# Patient Record
Sex: MALE | Race: WHITE | URBAN - METROPOLITAN AREA
[De-identification: names, ages, dates, MRNs, and addresses within clinical notes are randomized per-mention and may not be internally consistent; named-entity substitution may affect disease eponyms.]

---

## 2018-05-29 ENCOUNTER — OFFICE VISIT (OUTPATIENT)
Dept: FAMILY MEDICINE | Facility: CLINIC | Age: 58
End: 2018-05-29
Payer: COMMERCIAL

## 2018-05-29 VITALS
OXYGEN SATURATION: 99 % | BODY MASS INDEX: 21.52 KG/M2 | DIASTOLIC BLOOD PRESSURE: 64 MMHG | HEART RATE: 61 BPM | TEMPERATURE: 98.1 F | RESPIRATION RATE: 14 BRPM | HEIGHT: 74 IN | WEIGHT: 167.7 LBS | SYSTOLIC BLOOD PRESSURE: 102 MMHG

## 2018-05-29 DIAGNOSIS — G47.00 INSOMNIA, UNSPECIFIED TYPE: ICD-10-CM

## 2018-05-29 DIAGNOSIS — F33.2 SEVERE EPISODE OF RECURRENT MAJOR DEPRESSIVE DISORDER, WITHOUT PSYCHOTIC FEATURES (H): Primary | ICD-10-CM

## 2018-05-29 DIAGNOSIS — F41.9 ANXIETY: ICD-10-CM

## 2018-05-29 PROCEDURE — 99204 OFFICE O/P NEW MOD 45 MIN: CPT | Performed by: FAMILY MEDICINE

## 2018-05-29 RX ORDER — BUPROPION HYDROCHLORIDE 300 MG/1
300 TABLET ORAL EVERY MORNING
COMMUNITY

## 2018-05-29 RX ORDER — DIAZEPAM 5 MG
5 TABLET ORAL EVERY 12 HOURS PRN
Qty: 10 TABLET | Refills: 0 | Status: SHIPPED | OUTPATIENT
Start: 2018-05-29

## 2018-05-29 RX ORDER — AMITRIPTYLINE HYDROCHLORIDE 10 MG/1
20 TABLET ORAL AT BEDTIME
COMMUNITY
End: 2020-08-27

## 2018-05-29 RX ORDER — VIT C/B6/B5/MAGNESIUM/HERB 173 50-5-6-5MG
1 CAPSULE ORAL DAILY
COMMUNITY

## 2018-05-29 RX ORDER — DIAZEPAM 10 MG
10 TABLET ORAL EVERY 6 HOURS PRN
COMMUNITY

## 2018-05-29 RX ORDER — MAG HYDROX/ALUMINUM HYD/SIMETH 400-400-40
1 SUSPENSION, ORAL (FINAL DOSE FORM) ORAL DAILY
COMMUNITY

## 2018-05-29 RX ORDER — TRAZODONE HYDROCHLORIDE 50 MG/1
50 TABLET, FILM COATED ORAL AT BEDTIME
COMMUNITY

## 2018-05-29 RX ORDER — CITALOPRAM HYDROBROMIDE 20 MG/1
20 TABLET ORAL DAILY
COMMUNITY
End: 2020-08-27

## 2018-05-29 RX ORDER — IBUPROFEN 200 MG
200 TABLET ORAL EVERY 4 HOURS PRN
COMMUNITY

## 2018-05-29 ASSESSMENT — PATIENT HEALTH QUESTIONNAIRE - PHQ9: 5. POOR APPETITE OR OVEREATING: NEARLY EVERY DAY

## 2018-05-29 ASSESSMENT — ANXIETY QUESTIONNAIRES
2. NOT BEING ABLE TO STOP OR CONTROL WORRYING: NEARLY EVERY DAY
7. FEELING AFRAID AS IF SOMETHING AWFUL MIGHT HAPPEN: NEARLY EVERY DAY
3. WORRYING TOO MUCH ABOUT DIFFERENT THINGS: NEARLY EVERY DAY
GAD7 TOTAL SCORE: 19
5. BEING SO RESTLESS THAT IT IS HARD TO SIT STILL: MORE THAN HALF THE DAYS
IF YOU CHECKED OFF ANY PROBLEMS ON THIS QUESTIONNAIRE, HOW DIFFICULT HAVE THESE PROBLEMS MADE IT FOR YOU TO DO YOUR WORK, TAKE CARE OF THINGS AT HOME, OR GET ALONG WITH OTHER PEOPLE: EXTREMELY DIFFICULT
6. BECOMING EASILY ANNOYED OR IRRITABLE: MORE THAN HALF THE DAYS
1. FEELING NERVOUS, ANXIOUS, OR ON EDGE: NEARLY EVERY DAY

## 2018-05-29 NOTE — PROGRESS NOTES
SUBJECTIVE:   Arjun Muro is a 57 year old male who presents to clinic today for the following health issues:    History of depression-   Severe MDD- bad 22 yrs ago, likely melancholia for most of his life, but had first severe bout of sx's ~22 yrs ago when he was in a graduate program in [place].  Horrible episode.  Had insomnia for months during grad school in [place] (also triggered by untreated pneumonia that festered and worsened for months due to no health insurance).  Seeing therapist, finally recommended he see a psychiatrist.  It was a very difficult time, he ended up taking a year off of grad school, then went back and finished.    He has been mostly on psychiatric meds since then.    Has tried going off meds a couple times, but after second time, was told to stay on them for life.    Initially was put on paxil- stayed on that for many yrs, worked well.    ~15 yrs ago, some of the sx's returned, while on the paxil.  Went back into insomnia sx's, and a psychiatrist put him on clonazepam in addition to the paxil.  Was on that combination for yrs, then wellbutrin was added ~7 yrs ago.    ~4-5 yrs ago, was reading about clonazepam, long-term cognitive impairment issues.  Went to a NP primary care provider urgently, who weaned him off, and started a new regimen.  Kept him on the wellbutrin, but switched the paxil to citalopram.  For insomnia, tried amitriptyline for sleep first (but gave him grogginess se's), so switched that to trazodone 50-100mg/day.    Regimen for the past 4-5 yrs-   Citalopram, trazodone and wellbutrin.    Did well on those for awhile.    Sx's started worsening in the past year, worse in last couple months, and then horrible for past week. Insomnia returning, which makes him very anxious due to the horrible sx's he had in grad school.  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    Sx's in the past few months/year---  Depression like he's in a black hole.  Anxiety that is wrapped on top of that.  Insomnia started worsening a couple months ago- unable to get or stay asleep.  Very minimal sleep in last couple week.  Anxiety built- insomnia gives him almost PTSD thoughts of the first episode.    Tried increasing trazodone to 100mg at night, then found his old amitriptyline and tried that, but it still makes him really groggy.  Started having more SI thoughts, so went to ER 5 days ago.     At [facility] last week.  Rec stopping amitriptyline.  Rec psychiatrist, therapist.  Rx'd diazepam for SI.  Diazepam- said he could do up to 4x/day, but only doing 2x/day.  Helps a lot- was able to sleep.  12 hrs/night.  #15 rx'd, he has ~#10 left.      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  Administration doesn't realize he is close to breaking.  Needs the summer off.  Would like 12 weeks to reset and recharge and get his mood, anxiety and sleep under control.  9/10/18- first day of classes to return to work (though that is more than 12 weeks).      Fam h/o-   Sister - likely some depression  Father- etoh, rages  Mother- mdd  No known bipolar in family.        Problem list and histories reviewed & adjusted, as indicated.  Additional history: as documented    Patient Active Problem List   Diagnosis     Severe episode of recurrent major depressive disorder, without psychotic features (H)     Anxiety     Insomnia, unspecified type      Current Outpatient Prescriptions   Medication Sig Dispense Refill     ALLERGY INJECTIONS every 28 days       amitriptyline (ELAVIL) 10 MG tablet Take 20 mg by mouth At Bedtime       buPROPion (WELLBUTRIN XL) 300 MG 24 hr tablet Take 300 mg by mouth every morning       citalopram (CELEXA) 20 MG tablet Take 20 mg by mouth daily       diazepam (VALIUM) 10 MG tablet Take 10  "mg by mouth every 6 hours as needed for anxiety or sleep       diazepam (VALIUM) 5 MG tablet Take 1 tablet (5 mg) by mouth every 12 hours as needed for anxiety or sleep 10 tablet 0     ibuprofen (ADVIL/MOTRIN) 200 MG tablet Take 200 mg by mouth every 4 hours as needed for mild pain (take 2 tabs as needed)       Multiple Vitamin (MULTI VITAMIN  MENS) TABS Take 1 tablet by mouth daily       Omega-3 Fatty Acids (FISH OIL TRIPLE STRENGTH) 1400 MG CAPS Take 1 capsule by mouth daily       traZODone (DESYREL) 50 MG tablet Take 50 mg by mouth At Bedtime       Turmeric 500 MG CAPS Take 1 capsule by mouth daily       No Known Allergies  BP Readings from Last 3 Encounters:   05/29/18 102/64    Wt Readings from Last 3 Encounters:   05/29/18 167 lb 11.2 oz (76.1 kg)            Labs reviewed in EPIC    Reviewed and updated as needed this visit by clinical staff  Tobacco  Allergies  Meds  Problems  Med Hx  Surg Hx  Fam Hx  Soc Hx        Reviewed and updated as needed this visit by Provider  Allergies  Meds  Problems         ROS:  Constitutional, HEENT, cardiovascular, pulmonary, GI, , musculoskeletal, neuro, skin, endocrine and psych systems are negative, except as otherwise noted.    OBJECTIVE:     /64  Pulse 61  Temp 98.1  F (36.7  C) (Oral)  Resp 14  Ht 6' 1.5\" (1.867 m)  Wt 167 lb 11.2 oz (76.1 kg)  SpO2 99%  BMI 21.83 kg/m2  Body mass index is 21.83 kg/(m^2).  GENERAL: healthy, alert and no distress  EYES: Eyes grossly normal to inspection, PERRL and conjunctivae and sclerae normal  HENT: ear canals and TM's normal, nose and mouth without ulcers or lesions  NECK: no adenopathy, no asymmetry, masses, or scars and thyroid normal to palpation  RESP: lungs clear to auscultation - no rales, rhonchi or wheezes  CV: regular rate and rhythm, normal S1 S2, no S3 or S4, no murmur, click or rub, no peripheral edema and peripheral pulses strong  ABDOMEN: soft, nontender, no hepatosplenomegaly, no masses and bowel " sounds normal  MS: no gross musculoskeletal defects noted, no edema  SKIN: no suspicious lesions or rashes  NEURO: Normal strength and tone, mentation intact and speech normal  PSYCH: mentation appears normal, appears anxious and judgement and insight intact    Diagnostic Test Results:  none     ASSESSMENT/PLAN:       ICD-10-CM    1. Severe episode of recurrent major depressive disorder, without psychotic features (H) F33.2 MENTAL HEALTH REFERRAL  - Adult; Psychiatry and Medication Management; Psychiatry; Other: [facility] [phone]; We will contact you to schedule the appointment or please call with any questions     MENTAL HEALTH REFERRAL  - Adult; Outpatient Treatment; Individual/Couples/Family/Group Therapy/Health Psychology; Other: [facility] [phone]; We will contact you to schedule the appointment or please call with any questi...     diazepam (VALIUM) 5 MG tablet   2. Anxiety F41.9 MENTAL HEALTH REFERRAL  - Adult; Psychiatry and Medication Management; Psychiatry; Other: [facility] [phone]; We will contact you to schedule the appointment or please call with any questions     MENTAL HEALTH REFERRAL  - Adult; Outpatient Treatment; Individual/Couples/Family/Group Therapy/Health Psychology; Other: [facility] [phone]; We will contact you to schedule the appointment or please call with any questi...     diazepam (VALIUM) 5 MG tablet   3. Insomnia, unspecified type G47.00 MENTAL HEALTH REFERRAL  - Adult; Psychiatry and Medication Management; Psychiatry; Other: [facility] [phone]; We will contact you to schedule the appointment or please call with any questions     MENTAL HEALTH REFERRAL  - Adult; Outpatient Treatment; Individual/Couples/Family/Group Therapy/Health Psychology; Other: [facility] [phone]; We will contact you to schedule the appointment or  please call with any questi...     diazepam (VALIUM) 5 MG tablet     MDD/Anxiety/Insomnia-  Long h/o dysthymia, with severe bout of MDD/Anxiety in graduate school, and has been on medications since that time, with some adjustments when sx's have returned.  Insomnia does not sound c/w harrison, no other manic sx's.   Insomnia really worsens his other sx's, however.    He is feeling better with the diazepam (given to him from ER a few days ago), which allowed him to get good sleep the last few nights.  Agree with recs to get to psychiatry and start therapy- referrals placed.  He is also in agreement that the diazepam should be a short-term option and that he should use it only for a short time.  Had been on clonazepam for yrs in the past and requested going off himself due to reading about long-term cognitive se's.    Recommendations...  --Rec lower diazepam dose, and slowly weaning down and off.  --Rec using higher dose of trazodone - 150mg at night, and using 2.5mg (1/2 tab) if unable to get to sleep, and another 1/2 tab if still unable to sleep.    --Would stop the amitriptyline completely.  --For now, will have him continue on the citalopram 20mg/d and wellbutrin XL 300mg/d and focus on the sleep and less work.    --Work- okay with approving 12 weeks off work, though going until first day of school from now will be longer than 12 weeks, and goes into long-term disability, which is likely not needed based on today's assessment.  Will discuss this with him when forms come in.      Sivan Mullins MD  [facility]     Spent greater than 50% of 45 minutes in face to face time counseling patient regarding severe depression, anxiety and insomnia sx's and treatment plan, work stressors and plan.

## 2018-05-29 NOTE — MR AVS SNAPSHOT
After Visit Summary   5/29/2018    Arjun Muro    MRN: [id number]           Patient Information     Date Of Birth          1960        Visit Information        Provider Department      5/29/2018 11:30 AM Sivan Mullins MD [facility]        Today's Diagnoses     Severe episode of recurrent major depressive disorder, without psychotic features (H)    -  1    Anxiety        Insomnia, unspecified type           Follow-ups after your visit        Additional Services     MENTAL HEALTH REFERRAL  - Adult; Outpatient Treatment; Individual/Couples/Family/Group Therapy/Health Psychology; Other: [facility] [phone]; We will contact you to schedule the appointment or please call with any questi...       All scheduling is subject to the client's specific insurance plan & benefits, provider/location availability, and provider clinical specialities.  Please arrive 15 minutes early for your first appointment and bring your completed paperwork.    Please be aware that coverage of these services is subject to the terms and limitations of your health insurance plan.  Call member services at your health plan with any benefit or coverage questions.                      MENTAL HEALTH REFERRAL  - Adult; Psychiatry and Medication Management; Psychiatry; Other: [facility] [phone]; We will contact you to schedule the appointment or please call with any questions       All scheduling is subject to the client's specific insurance plan & benefits, provider/location availability, and provider clinical specialities.  Please arrive 15 minutes early for your first appointment and bring your completed paperwork.    Please be aware that coverage of these services is subject to the terms and limitations of your health insurance plan.  Call member services at your health plan with any benefit or coverage questions.                            Who to  "contact     If you have questions or need follow up information about today's clinic visit or your schedule please contact [facility] directly at [phone].  Normal or non-critical lab and imaging results will be communicated to you by MyChart, letter or phone within 4 business days after the clinic has received the results. If you do not hear from us within 7 days, please contact the clinic through MyChart or phone. If you have a critical or abnormal lab result, we will notify you by phone as soon as possible.  Submit refill requests through [employer] or call your pharmacy and they will forward the refill request to us. Please allow 3 business days for your refill to be completed.          Additional Information About Your Visit        [facility] Information     [employer] lets you send messages to your doctor, view your test results, renew your prescriptions, schedule appointments and more. To sign up, go to [email or web address] . Click on \"Log in\" on the left side of the screen, which will take you to the Welcome page. Then click on \"Sign up Now\" on the right side of the page.     You will be asked to enter the access code listed below, as well as some personal information. Please follow the directions to create your username and password.     Your access code is: [email or web address]  Expires: 2018 12:37 PM     Your access code will  in 90 days. If you need help or a new code, please call your [facility] clinic or [phone].        Care EveryWhere ID     This is your Care EveryWhere ID. This could be used by other organizations to access your [facility] medical records  [id number]        Your Vitals Were     Pulse Temperature Respirations Height Pulse Oximetry BMI (Body Mass Index)    61 98.1  F (36.7  C) (Oral) 14 6' 1.5\" (1.867 m) 99% 21.83 kg/m2       Blood Pressure from Last 3 Encounters:   18 102/64    Weight from Last 3 Encounters:   18 167 lb 11.2 oz (76.1 kg)              We " Performed the Following     MENTAL HEALTH REFERRAL  - Adult; Outpatient Treatment; Individual/Couples/Family/Group Therapy/Health Psychology; Other: [facility] [phone]; We will contact you to schedule the appointment or please call with any questi...     MENTAL HEALTH REFERRAL  - Adult; Psychiatry and Medication Management; Psychiatry; Other: [facility] [phone]; We will contact you to schedule the appointment or please call with any questions          Today's Medication Changes          These changes are accurate as of 5/29/18 12:37 PM.  If you have any questions, ask your nurse or doctor.               These medicines have changed or have updated prescriptions.        Dose/Directions    * diazepam 10 MG tablet   Commonly known as:  VALIUM   This may have changed:  Another medication with the same name was added. Make sure you understand how and when to take each.   Changed by:  Sivan Mullins MD        Dose:  10 mg   Take 10 mg by mouth every 6 hours as needed for anxiety or sleep   Refills:  0       * diazepam 5 MG tablet   Commonly known as:  VALIUM   This may have changed:  You were already taking a medication with the same name, and this prescription was added. Make sure you understand how and when to take each.   Used for:  Insomnia, unspecified type, Anxiety, Severe episode of recurrent major depressive disorder, without psychotic features (H)   Changed by:  Sivan Mullins MD        Dose:  5 mg   Take 1 tablet (5 mg) by mouth every 12 hours as needed for anxiety or sleep   Quantity:  10 tablet   Refills:  0       * Notice:  This list has 2 medication(s) that are the same as other medications prescribed for you. Read the directions carefully, and ask your doctor or other care provider to review them with you.         Where to get your medicines      Some of these will need a paper prescription and others can be bought over the counter.   Ask your nurse if you have questions.     Bring a paper prescription for each of these medications     diazepam 5 MG tablet                Primary Care Provider Office Phone # Fax #    Sivan Mullins [phone] [address] [address]        Equal Access to Services     AJ RODRIGUEZ : Hadii aad ku hadasho Soomaali, waaxda luqadaha, qaybta kaalmada adeegyada, waxay danielin hayjolenen adeantione elliottbrissa darby. So [facility] [phone].    ATENCIÓN: Si habla español, tiene a castro disposición servicios gratuitos de asistencia lingüística. [facility] [phone].    We comply with applicable federal civil rights laws and [place] laws. We do not discriminate on the basis of race, color, national origin, age, disability, sex, sexual orientation, or gender identity.            Thank you!     Thank you for choosing [facility]  for your care. Our goal is always to provide you with excellent care. Hearing back from our patients is one way we can continue to improve our services. Please take a few minutes to complete the written survey that you may receive in the mail after your visit with us. Thank you!             Your Updated Medication List - Protect others around you: Learn how to safely use, store and throw away your medicines at www.disposemymeds.org.          This list is accurate as of 5/29/18 12:37 PM.  Always use your most recent med list.                   Brand Name Dispense Instructions for use Diagnosis    ALLERGY INJECTIONS      every 28 days        amitriptyline 10 MG tablet    ELAVIL     Take 20 mg by mouth At Bedtime        citalopram 20 MG tablet    celeXA     Take 20 mg by mouth daily        * diazepam 10 MG tablet    VALIUM     Take 10 mg by mouth every 6 hours as needed for anxiety or sleep        * diazepam 5 MG tablet    VALIUM    10 tablet    Take 1 tablet (5 mg) by mouth every 12 hours as needed for anxiety or sleep    Insomnia, unspecified type,  Anxiety, Severe episode of recurrent major depressive disorder, without psychotic features (H)       FISH OIL TRIPLE STRENGTH 1400 MG Caps      Take 1 capsule by mouth daily        ibuprofen 200 MG tablet    ADVIL/MOTRIN     Take 200 mg by mouth every 4 hours as needed for mild pain (take 2 tabs as needed)        MULTI vitamin  MENS Tabs      Take 1 tablet by mouth daily        traZODone 50 MG tablet    DESYREL     Take 50 mg by mouth At Bedtime        Turmeric 500 MG Caps      Take 1 capsule by mouth daily        WELLBUTRIN  MG 24 hr tablet   Generic drug:  buPROPion      Take 300 mg by mouth every morning        * Notice:  This list has 2 medication(s) that are the same as other medications prescribed for you. Read the directions carefully, and ask your doctor or other care provider to review them with you.

## 2018-05-29 NOTE — NURSING NOTE
"Chief Complaint   Patient presents with     Stress     from work-New Patient     Personal     discuss personal issues       Initial /64  Pulse 61  Temp 98.1  F (36.7  C) (Oral)  Resp 14  Ht 6' 1.5\" (1.867 m)  Wt 167 lb 11.2 oz (76.1 kg)  SpO2 99%  BMI 21.83 kg/m2 Estimated body mass index is 21.83 kg/(m^2) as calculated from the following:    Height as of this encounter: 6' 1.5\" (1.867 m).    Weight as of this encounter: 167 lb 11.2 oz (76.1 kg).  BP completed using cuff size: regular    Health Maintenance that is potentially due pending provider review:  Health Maintenance Due   Topic Date Due     TETANUS IMMUNIZATION (SYSTEM ASSIGNED)  11/19/1976     HIV SCREEN (SYSTEM ASSIGNED)  11/19/1976     HEPATITIS C SCREENING  11/19/1976     LIPID SCREEN Q5 YR MALE (SYSTEM ASSIGNED)  11/19/1993     COLON CANCER SCREEN (SYSTEM ASSIGNED)  11/19/2008     ADVANCE DIRECTIVE PLANNING Q5 YRS  11/19/2013     colonoscopy done- [facility]-2017-WNL      FLORES--Sent FLORES for records and colonoscopy report  "

## 2018-05-30 ASSESSMENT — ANXIETY QUESTIONNAIRES: GAD7 TOTAL SCORE: 19

## 2018-05-30 ASSESSMENT — PATIENT HEALTH QUESTIONNAIRE - PHQ9: SUM OF ALL RESPONSES TO PHQ QUESTIONS 1-9: 25

## 2018-06-06 ENCOUNTER — TELEPHONE (OUTPATIENT)
Dept: FAMILY MEDICINE | Facility: CLINIC | Age: 58
End: 2018-06-06

## 2018-06-06 NOTE — LETTER
[facility]  [address]  [address]  [place], [place] [address]  [phone]    June 8, 2018    RE:  Arjun Muro                                                                                                                              [address]  [facility] [address]            To whom it may concern:    Please note that we will see Arjun Muro back in the office to follow-up on his condition and fully fill out FMLA forms on Tuesday, 6/12/18.  We anticipate his time off work to be roughly needed from 6/6/18 to 8/15/18.      Sincerely,        Sivan Mullins MD      Clinic hours:  Monday 7:30 AM - 5:00 PM    Tuesday  7:00 AM - 7:00 PM    Wednesday  7:00 AM - 5:00 PM    Thursday  7:30 AM -  7:00 PM    Friday   7:30 AM -  5:00 PM

## 2018-06-06 NOTE — TELEPHONE ENCOUNTER
Received form(s) from pt for fmla.  Placed form(s) in/on CW's box.  Forms need to be filled out and signed and faxed to number listed on form..    Call pt to verify form was sent: No  Copy needs to be sent for scanning after completion: Yes    Eileen Ivey CMA

## 2018-06-08 NOTE — TELEPHONE ENCOUNTER
Called and left msg to have him call back with good times to reach him today, or to talk to triage and give end/start dates for work (that is within 12 weeks for the [facility] [employer]) - I will likely be able to finish forms with that information.  Triage- you could try and reach him as well if able.  Thanks- CW

## 2018-06-08 NOTE — TELEPHONE ENCOUNTER
Called and spoke w/ pt-  Dates off work- 6/6/18- 8/15/18.  Unable to fill out FMLA forms, though because they wrote in incorrect information on the forms.  He will f/u next week on Tues (12:45pm appt- for f/u MDD and FMLA forms), and he'll bring in fresh form and we'll complete them at this point.    He requested we send a letter to his work stating that we'll compete FMLA forms on 6/12/18.  Letter completed- please fax Attn Gin Nielsen at [phone].    Please also do appt for him - 5/12/18, 12:45pm, 30 min.  F/u MDD and FMLA forms.  Thanks!  CW

## 2018-06-12 ENCOUNTER — OFFICE VISIT (OUTPATIENT)
Dept: FAMILY MEDICINE | Facility: CLINIC | Age: 58
End: 2018-06-12
Payer: COMMERCIAL

## 2018-06-12 VITALS
WEIGHT: 168.8 LBS | OXYGEN SATURATION: 99 % | BODY MASS INDEX: 21.66 KG/M2 | TEMPERATURE: 98.4 F | DIASTOLIC BLOOD PRESSURE: 66 MMHG | SYSTOLIC BLOOD PRESSURE: 122 MMHG | HEART RATE: 60 BPM | HEIGHT: 74 IN

## 2018-06-12 DIAGNOSIS — Z79.899 HIGH RISK MEDICATION USE: ICD-10-CM

## 2018-06-12 DIAGNOSIS — G47.00 INSOMNIA, UNSPECIFIED TYPE: ICD-10-CM

## 2018-06-12 DIAGNOSIS — F33.2 SEVERE EPISODE OF RECURRENT MAJOR DEPRESSIVE DISORDER, WITHOUT PSYCHOTIC FEATURES (H): Primary | ICD-10-CM

## 2018-06-12 DIAGNOSIS — Z13.21 ENCOUNTER FOR VITAMIN DEFICIENCY SCREENING: ICD-10-CM

## 2018-06-12 DIAGNOSIS — Z36.3 ENCOUNTER FOR ROUTINE SCREENING FOR MALFORMATION USING ULTRASONICS: ICD-10-CM

## 2018-06-12 DIAGNOSIS — F41.9 ANXIETY: ICD-10-CM

## 2018-06-12 LAB
BASOPHILS # BLD AUTO: 0 10E9/L (ref 0–0.2)
BASOPHILS NFR BLD AUTO: 0.4 %
DIFFERENTIAL METHOD BLD: ABNORMAL
EOSINOPHIL # BLD AUTO: 0.1 10E9/L (ref 0–0.7)
EOSINOPHIL NFR BLD AUTO: 1.4 %
ERYTHROCYTE [DISTWIDTH] IN BLOOD BY AUTOMATED COUNT: 12.4 % (ref 10–15)
HCT VFR BLD AUTO: 44.9 % (ref 40–53)
HGB BLD-MCNC: 15 G/DL (ref 13.3–17.7)
LYMPHOCYTES # BLD AUTO: 1.5 10E9/L (ref 0.8–5.3)
LYMPHOCYTES NFR BLD AUTO: 26.7 %
MCH RBC QN AUTO: 33.6 PG (ref 26.5–33)
MCHC RBC AUTO-ENTMCNC: 33.4 G/DL (ref 31.5–36.5)
MCV RBC AUTO: 100 FL (ref 78–100)
MONOCYTES # BLD AUTO: 0.5 10E9/L (ref 0–1.3)
MONOCYTES NFR BLD AUTO: 8.4 %
NEUTROPHILS # BLD AUTO: 3.6 10E9/L (ref 1.6–8.3)
NEUTROPHILS NFR BLD AUTO: 63.1 %
PLATELET # BLD AUTO: 125 10E9/L (ref 150–450)
RBC # BLD AUTO: 4.47 10E12/L (ref 4.4–5.9)
WBC # BLD AUTO: 5.7 10E9/L (ref 4–11)

## 2018-06-12 PROCEDURE — 80053 COMPREHEN METABOLIC PANEL: CPT

## 2018-06-12 PROCEDURE — 84403 ASSAY OF TOTAL TESTOSTERONE: CPT

## 2018-06-12 PROCEDURE — 36415 COLL VENOUS BLD VENIPUNCTURE: CPT

## 2018-06-12 PROCEDURE — 99214 OFFICE O/P EST MOD 30 MIN: CPT | Performed by: FAMILY MEDICINE

## 2018-06-12 PROCEDURE — 85025 COMPLETE CBC W/AUTO DIFF WBC: CPT

## 2018-06-12 PROCEDURE — 84443 ASSAY THYROID STIM HORMONE: CPT

## 2018-06-12 PROCEDURE — 82306 VITAMIN D 25 HYDROXY: CPT

## 2018-06-12 PROCEDURE — 84439 ASSAY OF FREE THYROXINE: CPT

## 2018-06-12 RX ORDER — MIRTAZAPINE 15 MG/1
15 TABLET, FILM COATED ORAL AT BEDTIME
COMMUNITY

## 2018-06-12 ASSESSMENT — ANXIETY QUESTIONNAIRES
GAD7 TOTAL SCORE: 19
2. NOT BEING ABLE TO STOP OR CONTROL WORRYING: NEARLY EVERY DAY
3. WORRYING TOO MUCH ABOUT DIFFERENT THINGS: NEARLY EVERY DAY
5. BEING SO RESTLESS THAT IT IS HARD TO SIT STILL: NEARLY EVERY DAY
7. FEELING AFRAID AS IF SOMETHING AWFUL MIGHT HAPPEN: NEARLY EVERY DAY
IF YOU CHECKED OFF ANY PROBLEMS ON THIS QUESTIONNAIRE, HOW DIFFICULT HAVE THESE PROBLEMS MADE IT FOR YOU TO DO YOUR WORK, TAKE CARE OF THINGS AT HOME, OR GET ALONG WITH OTHER PEOPLE: EXTREMELY DIFFICULT
1. FEELING NERVOUS, ANXIOUS, OR ON EDGE: NEARLY EVERY DAY
6. BECOMING EASILY ANNOYED OR IRRITABLE: SEVERAL DAYS

## 2018-06-12 ASSESSMENT — PATIENT HEALTH QUESTIONNAIRE - PHQ9: 5. POOR APPETITE OR OVEREATING: NEARLY EVERY DAY

## 2018-06-12 NOTE — PROGRESS NOTES
SUBJECTIVE:   Arjun Muro is a 57 year old male who presents to clinic today for the following health issues:    Depression and Anxiety Follow-Up    Status since last visit: about the same     Other associated symptoms:ratic sleep,headaches     Complicating factors:     Significant life event: Nothing since last visit      Current substance abuse: None    PHQ-9 5/29/2018 6/12/2018   Total Score 25 26   Q9: Suicide Ideation Several days Nearly every day     RYAN-7 SCORE 5/29/2018 6/12/2018   Total Score 19 19     In the past two weeks have you had thoughts of suicide or self-harm?  Yes  In the past two weeks have you thought of a plan or intent to harm yourself? No.  Do you have concerns about your personal safety or the safety of others?   No  PHQ-9  English  PHQ-9   Any Language  RYAN-7  Suicide Assessment Five-step Evaluation and Treatment (SAFE-T)    Amount of exercise or physical activity: every other day run or elliptical machine    Problems taking medications regularly: No    Medication side effects: hard to tell    Diet: regular (no restrictions)    Since last visit, updates.    Went to see psychiatry last Thur- telemedicine visit on screen.    Dr. Nelly Mitchell- based in [place], through [facility].  Pt felt good about the visit and care.  Feels like it is a workable consult despite her location.  Was on trazodone- 100mg, and really drowsy during the day, but slept.  He was taken off trazodone, put him on mirtazapine.  Mirtazapine 15mg/d. Slept for a couple nights, but then woke up at 4am.  More anxiety during day on it.  Has a call out to her to see if he should make some adjustments.  Next appt is next week.  Plans to make more med changes at that time.    He does still have valium to use as a rescue- used it Sun francisca, and when he woke up at 4am.  He's used a valium ~3 times since last visit.    Yesterday- second meeting with therapist.    Work-   Inquired about FMLA on 5/29/18, just had one  class left, and missed that (5/30/18 Tues).  Reported ill, after ER visit.  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  Missed meeting with edwina.  Didn't grade from last Tues to this Tues- finished all day yesterday.      Partner- Looking to get [facility] time off work to help care for pt, and get him to his appointments.  [email or web address]- work has approved it.  She brings in forms, but should be able to use his [facility] forms to justify her time.    She is helping with...  [employer]- keeping track of things- appts, medications, making sure that things are happening.  Driving- one time, didn't realize where he was.  13 and 15yo daughters.  Helps a lot when she is there.  Helps reduce his anxiety.  Making things easier for him.  Doing intermittent leave, working from home.  Taking time off to do appts.      Problem list and histories reviewed & adjusted, as indicated.  Additional history: as documented    Patient Active Problem List   Diagnosis     Severe episode of recurrent major depressive disorder, without psychotic features (H)     Anxiety     Insomnia, unspecified type     No past surgical history on file.    Social History   Substance Use Topics     Smoking status: Never Smoker     Smokeless tobacco: Never Used     Alcohol use Yes      Comment: 2x month 1 beer or 1 glass of wine     Family History   Problem Relation Age of Onset     Alzheimer Disease Mother      Substance Abuse Father      Substance Abuse Maternal Grandfather      Arthritis Paternal Grandfather      Depression Sister          Current Outpatient Prescriptions   Medication Sig Dispense Refill     buPROPion (WELLBUTRIN XL) 300 MG 24 hr tablet Take 300 mg by mouth every morning       citalopram (CELEXA) 20 MG tablet Take 20 mg by mouth daily       diazepam (VALIUM) 5 MG tablet Take 1 tablet (5 mg) by mouth every 12 hours as needed for anxiety or sleep 10 tablet 0     ibuprofen (ADVIL/MOTRIN) 200 MG tablet Take 200 mg by mouth every 4 hours  "as needed for mild pain (take 2 tabs as needed)       mirtazapine (REMERON) 15 MG tablet Take 15 mg by mouth At Bedtime       Multiple Vitamin (MULTI VITAMIN  MENS) TABS Take 1 tablet by mouth daily       Omega-3 Fatty Acids (FISH OIL TRIPLE STRENGTH) 1400 MG CAPS Take 1 capsule by mouth daily       Turmeric 500 MG CAPS Take 1 capsule by mouth daily       ALLERGY INJECTIONS every 28 days       amitriptyline (ELAVIL) 10 MG tablet Take 20 mg by mouth At Bedtime       diazepam (VALIUM) 10 MG tablet Take 10 mg by mouth every 6 hours as needed for anxiety or sleep       traZODone (DESYREL) 50 MG tablet Take 50 mg by mouth At Bedtime       No Known Allergies  Recent Labs   Lab Test  06/12/18   1337   ALT  31   CR  1.12   GFRESTIMATED  67   GFRESTBLACK  81   POTASSIUM  4.7   TSH  4.30*      BP Readings from Last 3 Encounters:   06/12/18 122/66   05/29/18 102/64    Wt Readings from Last 3 Encounters:   06/12/18 168 lb 12.8 oz (76.6 kg)   05/29/18 167 lb 11.2 oz (76.1 kg)            Labs reviewed in EPIC    Reviewed and updated as needed this visit by clinical staff  Tobacco  Allergies  Meds  Problems       Reviewed and updated as needed this visit by Provider  Allergies  Meds  Problems         ROS:  Constitutional, HEENT, cardiovascular, pulmonary, gi and gu systems are negative, except as otherwise noted.    OBJECTIVE:     /66  Pulse 60  Temp 98.4  F (36.9  C) (Oral)  Ht 6' 1.5\" (1.867 m)  Wt 168 lb 12.8 oz (76.6 kg)  SpO2 99%  BMI 21.97 kg/m2  Body mass index is 21.97 kg/(m^2).  GENERAL APPEARANCE: healthy, alert and no distress     EYES: PERRL, sclera clear     HENT: nose and mouth without ulcers or lesions     NECK: no adenopathy, no asymmetry, masses, or scars and thyroid normal to palpation     RESP: lungs clear to auscultation - no rales, rhonchi or wheezes     CV: regular rates and rhythm, normal S1 S2, no S3 or S4 and no murmur, click or rub      Abdomen: soft, nontender, no HSM or masses and " bowel sounds normal     Ext: warm, dry, no edema      Psych: full range affect, normal speech and grooming, judgement and insight intact     Diagnostic Test Results:  none     ASSESSMENT/PLAN:       ICD-10-CM    1. Severe episode of recurrent major depressive disorder, without psychotic features (H) F33.2 mirtazapine (REMERON) 15 MG tablet     CBC with platelets differential     Comprehensive metabolic panel (BMP + Alb, Alk Phos, ALT, AST, Total. Bili, TP)     **TSH with free T4 reflex FUTURE anytime     Vitamin D Deficiency     Testosterone, total     T4 free   2. Anxiety F41.9 mirtazapine (REMERON) 15 MG tablet     CBC with platelets differential     Comprehensive metabolic panel (BMP + Alb, Alk Phos, ALT, AST, Total. Bili, TP)     **TSH with free T4 reflex FUTURE anytime     Vitamin D Deficiency     Testosterone, total   3. Insomnia, unspecified type G47.00 mirtazapine (REMERON) 15 MG tablet     CBC with platelets differential     Comprehensive metabolic panel (BMP + Alb, Alk Phos, ALT, AST, Total. Bili, TP)     **TSH with free T4 reflex FUTURE anytime     Vitamin D Deficiency     Testosterone, total   4. High risk medication use Z79.899 mirtazapine (REMERON) 15 MG tablet     CBC with platelets differential     Comprehensive metabolic panel (BMP + Alb, Alk Phos, ALT, AST, Total. Bili, TP)     **TSH with free T4 reflex FUTURE anytime     Vitamin D Deficiency     Testosterone, total   5. Encounter for routine screening for malformation using ultrasonics Z13.89 mirtazapine (REMERON) 15 MG tablet     CBC with platelets differential     Comprehensive metabolic panel (BMP + Alb, Alk Phos, ALT, AST, Total. Bili, TP)     **TSH with free T4 reflex FUTURE anytime     Vitamin D Deficiency     Testosterone, total   6. Encounter for vitamin deficiency screening Z13.21 mirtazapine (REMERON) 15 MG tablet     CBC with platelets differential     Comprehensive metabolic panel (BMP + Alb, Alk Phos, ALT, AST, Total. Bili, TP)      **TSH with free T4 reflex FUTURE anytime     Vitamin D Deficiency     Testosterone, total     MDD/Anxiety/Insomnia-  Severe sx's recently.  Since last visit, he is now established with psychiatry (tele-psychiatry, but worked fine the first time), but only sleep med changes so far with mixed success.  Has call in to the sleep specialist re: the mirtazapine, and has f/u appt next week.    FMLA forms filled out today with details on timing for time off work this summer- plans is to take 12 weeks completely off work for treatment, self-cares and to recover from significant burn-out and depression symptoms.    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      F/u here as needed, otherwise most f/u will likely be through psychiatry for a bit.      Sivan Mullins MD  [facility]

## 2018-06-13 LAB
ALBUMIN SERPL-MCNC: 3.9 G/DL (ref 3.4–5)
ALP SERPL-CCNC: 77 U/L (ref 40–150)
ALT SERPL W P-5'-P-CCNC: 31 U/L (ref 0–70)
ANION GAP SERPL CALCULATED.3IONS-SCNC: 9 MMOL/L (ref 3–14)
AST SERPL W P-5'-P-CCNC: 39 U/L (ref 0–45)
BILIRUB SERPL-MCNC: 0.8 MG/DL (ref 0.2–1.3)
BUN SERPL-MCNC: 19 MG/DL (ref 7–30)
CALCIUM SERPL-MCNC: 9.3 MG/DL (ref 8.5–10.1)
CHLORIDE SERPL-SCNC: 107 MMOL/L (ref 94–109)
CO2 SERPL-SCNC: 26 MMOL/L (ref 20–32)
CREAT SERPL-MCNC: 1.12 MG/DL (ref 0.66–1.25)
DEPRECATED CALCIDIOL+CALCIFEROL SERPL-MC: 35 UG/L (ref 20–75)
GFR SERPL CREATININE-BSD FRML MDRD: 67 ML/MIN/1.7M2
GLUCOSE SERPL-MCNC: 86 MG/DL (ref 70–99)
POTASSIUM SERPL-SCNC: 4.7 MMOL/L (ref 3.4–5.3)
PROT SERPL-MCNC: 7.6 G/DL (ref 6.8–8.8)
SODIUM SERPL-SCNC: 142 MMOL/L (ref 133–144)
T4 FREE SERPL-MCNC: 0.92 NG/DL (ref 0.76–1.46)
TSH SERPL DL<=0.005 MIU/L-ACNC: 4.3 MU/L (ref 0.4–4)

## 2018-06-13 ASSESSMENT — ANXIETY QUESTIONNAIRES: GAD7 TOTAL SCORE: 19

## 2018-06-13 ASSESSMENT — PATIENT HEALTH QUESTIONNAIRE - PHQ9: SUM OF ALL RESPONSES TO PHQ QUESTIONS 1-9: 26

## 2018-06-14 LAB — TESTOST SERPL-MCNC: 527 NG/DL (ref 240–950)

## 2019-02-19 ENCOUNTER — TELEPHONE (OUTPATIENT)
Dept: FAMILY MEDICINE | Facility: CLINIC | Age: 59
End: 2019-02-19

## 2019-02-19 DIAGNOSIS — M79.662 PAIN OF LEFT LOWER LEG: ICD-10-CM

## 2019-02-19 DIAGNOSIS — M25.552 HIP PAIN, LEFT: Primary | ICD-10-CM

## 2019-02-19 NOTE — TELEPHONE ENCOUNTER
Dr Stroud patient is requesting a referral to [facility] physical therapy. Patient is a runner and had injured his hip. Muscle strain from hip down to his foot on the left side. This has happened before and therapy usually takes care of the problem. Pending referral Please advise. Thanks    Yaquelin Akers  Referral Coordinator

## 2020-04-10 ENCOUNTER — TRANSFERRED RECORDS (OUTPATIENT)
Dept: HEALTH INFORMATION MANAGEMENT | Facility: CLINIC | Age: 60
End: 2020-04-10

## 2020-04-14 ENCOUNTER — TRANSFERRED RECORDS (OUTPATIENT)
Dept: HEALTH INFORMATION MANAGEMENT | Facility: CLINIC | Age: 60
End: 2020-04-14

## 2020-05-08 ENCOUNTER — TRANSFERRED RECORDS (OUTPATIENT)
Dept: HEALTH INFORMATION MANAGEMENT | Facility: CLINIC | Age: 60
End: 2020-05-08

## 2020-05-08 ENCOUNTER — MEDICAL CORRESPONDENCE (OUTPATIENT)
Dept: HEALTH INFORMATION MANAGEMENT | Facility: CLINIC | Age: 60
End: 2020-05-08

## 2020-05-12 DIAGNOSIS — G12.21 ALS (AMYOTROPHIC LATERAL SCLEROSIS) (H): Primary | ICD-10-CM

## 2020-05-14 ENCOUNTER — OFFICE VISIT (OUTPATIENT)
Dept: NEUROLOGY | Facility: CLINIC | Age: 60
End: 2020-05-14
Payer: COMMERCIAL

## 2020-05-14 VITALS
RESPIRATION RATE: 16 BRPM | OXYGEN SATURATION: 100 % | DIASTOLIC BLOOD PRESSURE: 76 MMHG | SYSTOLIC BLOOD PRESSURE: 128 MMHG | HEART RATE: 60 BPM

## 2020-05-14 DIAGNOSIS — G12.21 ALS (AMYOTROPHIC LATERAL SCLEROSIS) (H): Primary | ICD-10-CM

## 2020-05-14 DIAGNOSIS — G12.21 ALS (AMYOTROPHIC LATERAL SCLEROSIS) (H): ICD-10-CM

## 2020-05-14 RX ORDER — RILUZOLE 50 MG/1
50 TABLET, FILM COATED ORAL EVERY 12 HOURS
Qty: 60 TABLET | Refills: 3 | Status: SHIPPED | OUTPATIENT
Start: 2020-05-14 | End: 2020-09-16

## 2020-05-14 ASSESSMENT — PAIN SCALES - GENERAL: PAINLEVEL: SEVERE PAIN (7)

## 2020-05-14 NOTE — PROGRESS NOTES
Service Date: 2020      Vj Jacob MD   [facility]    [address] [id number]      RE: Arjun Muro    MRN: [id number]   : 1960      Dear Dr. Jacob:       Thank you for referring Arjun Muro to the [facility].  I had the pleasure to evaluate him in person.  As you know, you were concerned about ALS specifically.      He is a very pleasant, 59-year-old  who has been a very athletic person and avid marathon runner.  He ran a marathon in 10/2019 in [facility] when it was raining, and everybody got sick.  Then he had a flare up of his irritable bowel syndrome.  He fell back on ice without major injury, and then in 2019 he ran another marathon, which went well.  In 2020, he started to recover from the marathon and trained again, and he noted that he was much slower on the elliptical or on the treadmill.  He was not tripping on his feet.  Then when he started running on the streets to train, he noticed that his pace was far slower than normal, and he began noticing a dropped left foot.  This was between -2020.  Those symptoms have progressed to date.  He has no weakness perceived in the right leg.  He has a peculiar feeling that he calls numbness from the left knee to the lateral calf and down to the top of the foot.  However, there is no tingling, burning pain or other positive sensory symptoms.  He does not complain of any sensory symptoms in the right leg or upper extremities.  He has noticed muscle twitching, which per his wife also occurs in the back region, thighs and shoulders, and it is not limited to the left lower leg.  He also gets occasional cramps when he bends over to get something from the ground.  He has not noticed any upper extremity weakness, clumsiness or incoordination.  He has no trouble getting up from a chair, walking up or down stairs, turning in bed.  It is just running that his foot tends to catch.   He denies incontinence of bladder or bowel, and there is no perineal anesthesia.  He has no back pain or sciatica-like pain associated with this.  He denies dysphagia, dysarthria, sialorrhea, difficulty chewing, head drop, ptosis, diplopia or any other cranial nerve symptoms.  His wife mentioned that when he drinks water too quickly, sometimes he may cough, but that occurs very infrequently.     He does not have any family history of ALS.  His mother was diagnosed with Alzheimer disease in her mid 60s with a presentation of memory loss and lived over 10 years after the Alzheimer diagnosis.  There is nobody else with neurodegenerative disease.  He does not smoke.  He drinks alcohol no more than once a week, and he works as a .        He had an extensive workup by you already, including an EMG, which I reviewed carefully.  Despite his complaint of abnormal sensation in the left lower leg, there is no evidence of any sensory nerve conduction abnormality in the left lower extremity.  Left median, ulnar, superficial peroneal, and sural sensory nerve responses were normal.  Motor nerve responses were also normal with the exception of a small left tibial.  Conduction velocities and latencies were all okay.  Needle exam showed denervation with fibrillations, fasciculations and somewhat reduced recruitment of large motor units in the left tibialis anterior, medial gastrocnemius, biceps femoris, vastus lateralis, but also some fibrillations in the left FDI and pronator teres.  Thoracic paraspinals were not sampled.  Lumbar paraspinals were normal.  He had a whole neuraxis MRI, including brain, cervical, thoracic and lumbar spine, which in my opinion did not show any lesions that would explain the symptoms.  In terms of lab workup, his primary care doctor has ordered recently a comprehensive metabolic panel, which was significant for a minimally elevated creatinine at 1.24.  Thyroid peroxidase, which was normal.  T4 and  TSH were normal.  He has a very slight macrocytosis with MCV of 99.8.  CK is elevated at 942.  Folate and B12 were normal and so were tissue transglutaminase antibodies for celiac disease.      PAST MEDICAL HISTORY:  Significant for depression and anxiety, but otherwise free.      FAMILY HISTORY, SOCIAL HISTORY, REVIEW OF SYSTEMS AND MEDICATIONS and ALLERGIES:  Per Epic record.      NEUROLOGIC EXAMINATION:  Vital signs: /76   Pulse 60   Resp 16   SpO2 100%      He is a pleasant individual, able to provide a coherent and linear history.  Cranial nerves showed the following:  There was no ptosis or ophthalmoparesis.  Pupils were reactive to light and accommodation with very slight physiologic anisocoria, right 4, left 3.5 mm.  There was no weakness of orbicularis oculi, oris, uvula, jaw, palate or tongue.  I did not see any tongue atrophy or fasciculations, and lateral tongue movements were done very fast.  There was no dysarthria or dysphonia.  He did have a clearly brisk jaw jerk, though.  His strength of neck flexion and extension was intact.  His strength was 5/5 for bilateral deltoids, biceps, triceps and wrist extensors.  FDI strength was normal to borderline reduced at 5-.  Finger extension was probably mildly weak at 4.  APB strength was intact.  There was no thenar or striking FDI atrophy that I saw, and I did not appreciate any fasciculations or atrophy in the upper extremities in general.  Agility of repetitive finger taps was intact, too.  In terms of tone, the right upper extremity was normal.  The left probably shows a mildly increased resistance to passive movements.  Leg strength was 5/5 for bilateral hip flexion and knee extension.  Knee flexion was right 5, left 5-.  Dorsiflexion was right 5-, left mildly weak at 4+.  Great toe extension was 4 on both sides.  Plantar flexion was normal.  Inversion of the foot was minimally weak on the left at 5-, probably normal on the right.  I saw  fasciculations in bilateral gastrocnemius muscles, but I did not examine the upper thigh muscles carefully.  Tone was normal in the right leg. There was a slight spastic catch on the left.  Reflexes were clearly pathologic.  In the upper extremities, they were 3+ with vertical spread in biceps and brachioradialis and particularly triceps, and he had positive pectoralis and Adeola reflex.  In the legs, there was bilateral knee clonus (4+), 3+ right ankle reflex, and 3 to 4+ at the left ankle with a few beats of ankle clonus.  The left toe was clearly upgoing; the right was neutral.  Agility of foot tapping was normal. There was no clear bradykinesia.    Despite his subjective complaint of abnormal sensation in the left lower leg, sensory exam was completely intact showing vibration of 6 seconds at the right great toe and even better- 9 at the left, over 10 at the medial malleoli and normal position sensation, light touch and pinprick without any side-to-side asymmetries.  Finger to nose was done without dysmetria.  He was able to rise from a chair with arms crossed on the chest without difficulties.  He could walk on his heels fairly comfortably, only that the left leg was lagging perhaps a little behind.  He had no problems walking on his toes.  When observing his casual gait, I had the sense that he was slightly dragging the left leg, indicating hypertonia.      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      I spent about 45 minutes with the patient, of which more than half was counseling.  I told him I basically agree with your conclusion.  You have done an extensive workup to rule out mimics of ALS, and there are no imaging findings from brain to lumbar spine that would account for this clinical exam.  In addition, he has clear combined lower and upper motor neuron signs in the lumbar region.  He has a left upgoing toe, knee clonus, very mild leg spasticity and clear weakness of the foot  dorsiflexion, but also foot inversion, with a needle examination showing segmental denervation not limited to the peroneal muscles.  It is also of particular concern that you found signs of denervation in the upper extremities despite the fact that I hardly appreciate any weakness there, other than perhaps the finger extensors.      We discussed the diagnosis, natural history and prognosis of ALS at some length with the patient.  We discussed available treatment options.  I encouraged him to start riluzole 50 mg b.i.d.  We will check liver function tests monthly for the next 3 months.  I also presented him the option of starting edaravone, which can slow down the progression of ALS in addition to riluzole.  However, edaravone requires 14 days per month infusion for the first 2 months, and because of the concerns of the coronavirus and the need for frequent trips to the Infusion Center, we will try to push it back for a few months.  We will obtain a spirometry today, which is important for prognosis.  He does not have any respiratory complaints.  I will connect him to our physical therapist and our research coordinator.  I also discussed research in brief, and I gave him the e-mail link for [facility] to ask questions about their current status of stem cell trials.  The patient understands that a lot of ALS research trials have been put on hold right now because of the COVID-19 pandemic.  He will return to clinic for followup in 3 months or sooner if needed.  I answered all his questions and his wife's, who was available on the phone, to the best I could.        Sincerely,      Abdulkadir Smalls MD       cc:   Sivan Mullins MD   [address] [address]         ABDULKADIR SMALLS MD             D: 2020   T: 2020   MT: mane      Name:     VEE BOWLES   MRN:      [id number]        Account:      [id number]   :      1960            Service Date: 05/14/2020      Document: [id number]

## 2020-05-14 NOTE — NURSING NOTE
Chief Complaint   Patient presents with     Consult     [facility] NEW ALS/MOTOR NEURON       Janusz Butcher

## 2020-05-14 NOTE — PATIENT INSTRUCTIONS
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 Liver function tests should be checked monthly for the next three months (blood draw). The medication is generally well tolerated; common side effects include dizziness, headache, mild nausea, fatigue, and infrequently disturbance of liver function.  This drug can prolong survival in ALS by an average of 3 months. This amount varies considerably per individual and cannot always be predicted.     There is another drug that can slow down progression of ALS called Radicava. This is an IV drug that has to be given 14 days a month for the first two months, then 10 days per month. Perhaps because of the virus concerns and need to go to an Infusion Center regularly to get it, it may be prudent to postpone it for a few months.    I will ask our physical therapist to reach you, and our Research coordinator.    If you are interested in the [facility] ALS stem cell research, please send an email to [email or web address]    Thank you!      Please call [facility] @ [phone] for questions or concerns during regular business hours. For a more efficient way to communicate, use Quick[employer] and address the message to your physician. Remember, [employer] is only read during business hours. Do not leave urgent messages on voicemail or Quick[employer]. If situation is urgent, contact the Neurology Clinic @ [phone] and ask to speak to a Triage Nurse or Call 911 or visit an Emergency Department.    Please call your pharmacy if you need a medication refill. They will send us an electronic message.

## 2020-05-14 NOTE — LETTER
2020       RE: Arjun Muro  [address]  [facility] [address]     Dear Colleague,    Thank you for referring your patient, Arjun Muro, to the [facility] NEUROLOGY at [facility]. Please see a copy of my visit note below.    Service Date: 2020      Vj Jacob MD   [facility] Neurological [facility]    [address] [place] [address]      RE: Arjun Muro    MRN: [id number]   : 1960      Dear Dr. Jacob:       Thank you for referring Arjun Muro to the [facility].  I had the pleasure to evaluate him in person.  As you know, you were concerned about ALS specifically.      He is a very pleasant, 59-year-old  who has been a very athletic person and avid marathon runner.  He ran a marathon in 10/2019 in . when it was raining, and everybody got sick.  Then he had a flare up of his irritable bowel syndrome.  He fell back on ice without major injury, and then in 2019 he ran another marathon, which went well.  In 2020, he started to recover from the marathon and trained again, and he noted that he was much slower on the elliptical or on the treadmill.  He was not tripping on his feet.  Then when he started running on the streets to train, he noticed that his pace was far slower than normal, and he began noticing a dropped left foot.  This was between -2020.  Those symptoms have progressed to date.  He has no weakness perceived in the right leg.  He has a peculiar feeling that he calls numbness from the left knee to the lateral calf and down to the top of the foot.  However, there is no tingling, burning pain or other positive sensory symptoms.  He does not complain of any sensory symptoms in the right leg or upper extremities.  He has noticed muscle twitching, which per his wife also occurs in the back region, thighs and shoulders, and it is not limited to the left lower leg.  He also gets occasional  cramps when he bends over to get something from the ground.  He has not noticed any upper extremity weakness, clumsiness or incoordination.  He has no trouble getting up from a chair, walking up or down stairs, turning in bed.  It is just running that his foot tends to catch.  He denies incontinence of bladder or bowel, and there is no perineal anesthesia.  He has no back pain or sciatica-like pain associated with this.  He denies dysphagia, dysarthria, sialorrhea, difficulty chewing, head drop, ptosis, diplopia or any other cranial nerve symptoms.  His wife mentioned that when he drinks water too quickly, sometimes he may cough, but that occurs very infrequently.     He does not have any family history of ALS.  His mother was diagnosed with Alzheimer disease in her mid 60s with a presentation of memory loss and lived over 10 years after the Alzheimer diagnosis.  There is nobody else with neurodegenerative disease.  He does not smoke.  He drinks alcohol no more than once a week, and he works as a .        He had an extensive workup by you already, including an EMG, which I reviewed carefully.  Despite his complaint of abnormal sensation in the left lower leg, there is no evidence of any sensory nerve conduction abnormality in the left lower extremity.  Left median, ulnar, superficial peroneal, and sural sensory nerve responses were normal.  Motor nerve responses were also normal with the exception of a small left tibial.  Conduction velocities and latencies were all okay.  Needle exam showed denervation with fibrillations, fasciculations and somewhat reduced recruitment of large motor units in the left tibialis anterior, medial gastrocnemius, biceps femoris, vastus lateralis, but also some fibrillations in the left FDI and pronator teres.  Thoracic paraspinals were not sampled.  Lumbar paraspinals were normal.  He had a whole neuraxis MRI, including brain, cervical, thoracic and lumbar spine, which in my  opinion did not show any lesions that would explain the symptoms.  In terms of lab workup, his primary care doctor has ordered recently a comprehensive metabolic panel, which was significant for a minimally elevated creatinine at 1.24.  Thyroid peroxidase, which was normal.  T4 and TSH were normal.  He has a very slight macrocytosis with MCV of 99.8.  CK is elevated at 942.  Folate and B12 were normal and so were tissue transglutaminase antibodies for celiac disease.      PAST MEDICAL HISTORY:  Significant for depression and anxiety, but otherwise free.      FAMILY HISTORY, SOCIAL HISTORY, REVIEW OF SYSTEMS AND MEDICATIONS and ALLERGIES:  Per Epic record.      NEUROLOGIC EXAMINATION:  Vital signs: /76   Pulse 60   Resp 16   SpO2 100%      He is a pleasant individual, able to provide a coherent and linear history.  Cranial nerves showed the following:  There was no ptosis or ophthalmoparesis.  Pupils were reactive to light and accommodation with very slight physiologic anisocoria, right 4, left 3.5 mm.  There was no weakness of orbicularis oculi, oris, uvula, jaw, palate or tongue.  I did not see any tongue atrophy or fasciculations, and lateral tongue movements were done very fast.  There was no dysarthria or dysphonia.  He did have a clearly brisk jaw jerk, though.  His strength of neck flexion and extension was intact.  His strength was 5/5 for bilateral deltoids, biceps, triceps and wrist extensors.  FDI strength was normal to borderline reduced at 5-.  Finger extension was probably mildly weak at 4.  APB strength was intact.  There was no thenar or striking FDI atrophy that I saw, and I did not appreciate any fasciculations or atrophy in the upper extremities in general.  Agility of repetitive finger taps was intact, too.  In terms of tone, the right upper extremity was normal.  The left probably shows a mildly increased resistance to passive movements.  Leg strength was 5/5 for bilateral hip flexion  and knee extension.  Knee flexion was right 5, left 5-.  Dorsiflexion was right 5-, left mildly weak at 4+.  Great toe extension was 4 on both sides.  Plantar flexion was normal.  Inversion of the foot was minimally weak on the left at 5-, probably normal on the right.  I saw fasciculations in bilateral gastrocnemius muscles, but I did not examine the upper thigh muscles carefully.  Tone was normal in the right leg. There was a slight spastic catch on the left.  Reflexes were clearly pathologic.  In the upper extremities, they were 3+ with vertical spread in biceps and brachioradialis and particularly triceps, and he had positive pectoralis and Adeola reflex.  In the legs, there was bilateral knee clonus (4+), 3+ right ankle reflex, and 3 to 4+ at the left ankle with a few beats of ankle clonus.  The left toe was clearly upgoing; the right was neutral.  Agility of foot tapping was normal. There was no clear bradykinesia.    Despite his subjective complaint of abnormal sensation in the left lower leg, sensory exam was completely intact showing vibration of 6 seconds at the right great toe and even better- 9 at the left, over 10 at the medial malleoli and normal position sensation, light touch and pinprick without any side-to-side asymmetries.  Finger to nose was done without dysmetria.  He was able to rise from a chair with arms crossed on the chest without difficulties.  He could walk on his heels fairly comfortably, only that the left leg was lagging perhaps a little behind.  He had no problems walking on his toes.  When observing his casual gait, I had the sense that he was slightly dragging the left leg, indicating hypertonia.      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      I spent about 45 minutes with the patient, of which more than half was counseling.  I told him I basically agree with your conclusion.  You have done an extensive workup to rule out mimics of ALS, and there are no  imaging findings from brain to lumbar spine that would account for this clinical exam.  In addition, he has clear combined lower and upper motor neuron signs in the lumbar region.  He has a left upgoing toe, knee clonus, very mild leg spasticity and clear weakness of the foot dorsiflexion, but also foot inversion, with a needle examination showing segmental denervation not limited to the peroneal muscles.  It is also of particular concern that you found signs of denervation in the upper extremities despite the fact that I hardly appreciate any weakness there, other than perhaps the finger extensors.      We discussed the diagnosis, natural history and prognosis of ALS at some length with the patient.  We discussed available treatment options.  I encouraged him to start riluzole 50 mg b.i.d.  We will check liver function tests monthly for the next 3 months.  I also presented him the option of starting edaravone, which can slow down the progression of ALS in addition to riluzole.  However, edaravone requires 14 days per month infusion for the first 2 months, and because of the concerns of the coronavirus and the need for frequent trips to the Infusion [facility], we will try to push it back for a few months.  We will obtain a spirometry today, which is important for prognosis.  He does not have any respiratory complaints.  I will connect him to our physical therapist and our research coordinator.  I also discussed research in brief, and I gave him the e-mail link for [facility] to ask questions about their current status of stem cell trials.  The patient understands that a lot of ALS research trials have been put on hold right now because of the COVID-19 pandemic.  He will return to clinic for followup in 3 months or sooner if needed.  I answered all his questions and his wife's, who was available on the phone, to the best I could.        Sincerely,      Abdulkadir Rivera MD       cc:   Sivan Mullins MD   [facility]    [address] [place] [address]        D: 2020   T: 2020   MT: mane   Name:     VEE BOWLES   MRN:      [id number]        Account:      [id number]   :      1960           Service Date: 2020   Document: [id number]

## 2020-05-15 LAB
EXPTIME-PRE: 7.13 SEC
FEF2575-%PRED-PRE: 92 %
FEF2575-PRE: 3.03 L/SEC
FEF2575-PRED: 3.29 L/SEC
FEFMAX-%PRED-PRE: 100 %
FEFMAX-PRE: 10.35 L/SEC
FEFMAX-PRED: 10.32 L/SEC
FEV1-%PRED-PRE: 83 %
FEV1-PRE: 3.48 L
FEV1FEV6-PRE: 79 %
FEV1FEV6-PRED: 79 %
FEV1FVC-PRE: 79 %
FEV1FVC-PRED: 76 %
FIFMAX-PRE: 5.49 L/SEC
FVC-%PRED-PRE: 80 %
FVC-PRE: 4.38 L
FVC-PRED: 5.44 L
MEP-PRE: 100 CMH2O
MIP-PRE: -110 CMH2O

## 2020-05-18 ENCOUNTER — PATIENT OUTREACH (OUTPATIENT)
Dept: CARE COORDINATION | Facility: CLINIC | Age: 60
End: 2020-05-18

## 2020-05-18 NOTE — PROGRESS NOTES
Social Work -ALS Clinic Progress Note  [facility]    Data/Intervention:    Patient Name:  Arjun Muro  /Age:  1960 (59 year old)    Visit Type: telephone    Collaborated With:    -Arjun  -ALS association    Updates/Concerns:   Arjun indicated he was dx last week with ALS and he is wondering about applying for SSDI and Medicare.   He has not been in his position for a year yet (). He isn't aware of whether he has STD/LTD benefits. He works for the [employer]. We exchanged VM messages last week and over the weekend he completed his online SSDI application. He is still working and will need to update social security of his work end date when that is determined.  He told his dtrs over the weekend (ages 18, 15) and that was very difficult. He indicated that they are doing ok. Upset as would be expected. His partner of 5 years Marcela is also struggling with the news. Arujn indicates that he is mostly doing ok. He is on/off upset.  He has friends he's going to contact soon about his dx and he's thinking about also trying to see his former therapist who was very helpful to him in the past.      Intervention/Education/Resources Provided:  Discussed STD/LTD/SSDI/FMLA/Medicare. He needs to talk with his HR re access to disability benefits. He may decide to work until  if that is helpful. He indicated that his workplace has been very supportive.   Discussed ALSA and he consents to have me request they contact him to provide info about their services.  Discussed coping and provided validation and info about self care during this time of crisis about his health.     Assessment/Plan:  Arjun is aware he can contact me as needed. Encouraged him to contact me if/when he needs to make insurance changes to review some of the implications.  He appears to be doing ok under the circumstances with coping.     Provided patient/family with contact information and encouraged them to  contact me between clinic visits as needed.     ARIADNE Toledo, [facility]    [facility]  [phone]

## 2020-06-11 ENCOUNTER — HOSPITAL ENCOUNTER (OUTPATIENT)
Dept: PHYSICAL THERAPY | Facility: CLINIC | Age: 60
Setting detail: THERAPIES SERIES
End: 2020-06-11
Attending: PSYCHIATRY & NEUROLOGY
Payer: COMMERCIAL

## 2020-06-11 DIAGNOSIS — G12.21 ALS (AMYOTROPHIC LATERAL SCLEROSIS) (H): ICD-10-CM

## 2020-06-11 PROCEDURE — 97535 SELF CARE MNGMENT TRAINING: CPT | Mod: GP | Performed by: PHYSICAL THERAPIST

## 2020-06-11 PROCEDURE — 97110 THERAPEUTIC EXERCISES: CPT | Mod: GP | Performed by: PHYSICAL THERAPIST

## 2020-06-11 PROCEDURE — 97162 PT EVAL MOD COMPLEX 30 MIN: CPT | Mod: GP | Performed by: PHYSICAL THERAPIST

## 2020-06-11 ASSESSMENT — 6 MINUTE WALK TEST (6MWT)
TOTAL DISTANCE WALKED (FT): 1700
COMMENTS: NO AD

## 2020-06-11 NOTE — IP AVS SNAPSHOT
MRN:[id number]                      After Visit Summary   6/11/2020    Arjun Muro    MRN: [id number]           Visit Information        Provider Department      6/11/2020  1:00 PM Alessia Sierra, [facility], [place], Physical Therapy - Outpatient        Your next 10 appointments already scheduled    Aug 20, 2020  9:00 AM CDT  PFT VISIT with SRINIVASA WILCOX  [facility] Pulmonary Function Testing ([facility]) [address] [place] [id number] [phone]      Aug 20, 2020 10:00 AM CDT  (Arrive by 9:45 AM)  Return ALS/Motor Neuron with Abdulkadir Rivera MD  [facility] Neurology ([facility]) [address] [place] [id number] [phone]           Further instructions from your care team       Keep a list of questions you have and bring them to therapy.  - Disease process  - what to expect  - activities to do or not      Exercise  - Biking: just listen your body and communicate with your wife if something doesn't feel right (balance, fatigue, muscle stiffness). Might need to play with gears to keep your pedal speed down.  - Stretching: especially focus on hamstrings and calf on the left, might need to do this multiple times per day. 30 secs, 2-3reps in a set  - Strengthening: we will work on this next together          Work[employer] Information    [employer] gives you secure access to your electronic health record. If you see a primary care provider, you can also send messages to your care team and make appointments. If you have questions, please call your primary care clinic.  If you do not have a primary care provider, please call [phone] and they will assist you.       Care EveryWhere ID    This is your Care EveryWhere ID. This could be used by other organizations to access your [place] medical records  [id number]       Equal Access to Services    AJ SILVESTRE: Jose Ramos,  laura martin, fior zazueta, aung darby. So [facility] [phone].    ATENCIÓN: Si habla español, tiene a castro disposición servicios gratuitos de asistencia lingüística. Llame al [phone].    We comply with applicable federal and state civil rights laws, including the [place] Human Rights Act. We do not discriminate on the basis of race, color, creed, Buddhist, national origin, marital status, age, disability, sex, sexual orientation, or gender identity.

## 2020-06-11 NOTE — DISCHARGE INSTRUCTIONS
Keep a list of questions you have and bring them to therapy.  - Disease process  - what to expect  - activities to do or not      Exercise  - Biking: just listen your body and communicate with your wife if something doesn't feel right (balance, fatigue, muscle stiffness). Might need to play with gears to keep your pedal speed down.  - Stretching: especially focus on hamstrings and calf on the left, might need to do this multiple times per day. 30 secs, 2-3reps in a set  - Strengthening: we will work on this next together

## 2020-06-13 NOTE — PROGRESS NOTES
06/11/20 1300   Quick Adds   Type of Visit Initial OP PT Evaluation   General Information   Start of Care Date 06/11/20   Referring Physician Dr Williams Rivera   Orders Evaluate and Treat as Indicated   Order Date 05/14/20   Medical Diagnosis ALS (amyotrophic lateral sclerosis)   Onset of illness/injury or Date of Surgery 05/14/20   Surgical/Medical history reviewed Yes   Pertinent history of current problem (include personal factors and/or comorbidities that impact the POC) Pt formally diagnosed with ALS on 5/14/2020 by Dr Rivera, but had been having sx since approx Jan 2020. Pt ran a marathon both in Oct and Dec 2019. Pt noted that he did not recooperate as previously expected with some drop foot L noted when trying to run again. Pt noted cramping in L LE. Muscle twitching in back, thighs, and shoulders. Pt with confirmed ALS diagnosis 5/14/2020 with Dr Rivera, who also noted hyperreflexive LEs. PFT 5/14/20 with FEV1 83%/   Patient role/Employment history Employed  (remotely , COVID-full time. going out on disability)   Living environment [facility]   Home/Community Accessibility Comments 3-4 stairs to enter, bed and bath on main level, laundry in basement. Storage in basement, unfinnished.   Patient/Family Goals Statement Planning to go out on disability in July. Not sure if FDC or ESPERANZA. I want to know more about what I can do to slow this ALS down.   General Information Comments IADLs: Mowing lawn, dishes, cooking - more fatiging than in the past, not needing to take breaks. No naps. Sleeping 9 hours per night - 1 pillow.    Fall Risk Screen   Fall screen completed by PT   Have you fallen 2 or more times in the past year? Yes   Have you fallen and had an injury in the past year? Yes   Timed Up and Go score (seconds) 6.22   Is patient a fall risk? Yes;Department fall risk interventions implemented   Fall screen comments 1 fall on Thanksgiving, few falls when first going out biking.  "brusied up ribs. Yesterday fell while biking on a stoplight all the way down, helmet. No falls while walking, some stiffness when getting up in the morning.   Vitals Signs   Heart Rate 56   Blood Pressure 127/79   Vital Signs Comments After 6MWT: 145/83, HR 68   Cognitive Status Examination   Orientation orientation to person, place and time   Level of Consciousness alert   Follows Commands and Answers Questions 100% of the time;able to follow multistep instructions   Personal Safety and Judgment intact   Memory intact   Cognitive Comment managing all meds on own.   Range of Motion (ROM)   ROM Comment Pt notes tightness in L LE especially. L ankle to neutral only.   Strength   Strength Comments B DF 4/5, R sh 4/5, otherwise 5/5 diffusely.   Transfer Skills   Transfer Comments Pt able to stand without UEs easily from standard height chair.   Gait   Gait Comments Pt amb without AD with decreased adam from baseline, but good heel strike B. With increased walking adam, notable spasticity L LE with swing phase (fixed knee and ankle flexion). Pt notes he has not been running.   Gait Special Tests 25 Foot Timed Walk   Seconds 6.88   Gait Special Tests Functional Gait Assessment Score out of 30   Score out of 30 23   Comments see note   Gait Special Tests Six Minute Walk Test   Feet 1700 Feet   Comments no AD   Balance Special Tests Sit to Stand Reps in 30 Seconds   Reps in 30 seconds 19   Height 17\"   Muscle Tone   Muscle Tone Comments Cami 1+ L HS, GS.   Planned Therapy Interventions   Planned Therapy Interventions strengthening;ROM;stretching;balance training;gait training;neuromuscular re-education;orthotic fitting/training   Clinical Impression   Criteria for Skilled Therapeutic Interventions Met yes, treatment indicated   PT Diagnosis impaired gait and balance   Influenced by the following impairments spasticity L LE, decreased LE and shoulder strength, decreased activity tolerance/fatigue.   Functional " limitations due to impairments impaired balance, gait, falls.   Clinical Presentation Evolving/Changing   Clinical Presentation Rationale progressive nature of ALS   Clinical Decision Making (Complexity) Moderate complexity   Therapy Frequency 1 time/week  (anticipate 4 sessions)   Predicted Duration of Therapy Intervention (days/wks) 90 days   Risk & Benefits of therapy have been explained Yes   Patient, Family & other staff in agreement with plan of care Yes   Goal 1   Goal Identifier HEP   Goal Description Pt demo understanding of safe exercise in context of ALS.   Target Date 09/08/20   Goal 2   Goal Identifier Compensation strategies   Goal Description Pt report understanding of at least 3 compensation strategies for management of fatigue to promote safety/reduce falls risk, and improve QOL.   Target Date 09/08/20   Total Evaluation Time   PT Eval, Moderate Complexity Minutes (74579) 30     Benita Sierra DPT, NCS  Physical Therapist     [facility] [place] [address]  [place] [address]  [email or web address]  Office: [phone]  Pager: [phone]  Fax: [phone]  Gender Pronouns: she/her  Employed by [employer]

## 2020-06-13 NOTE — PROGRESS NOTES
06/11/20 1300   Signing Clinician's Name / Credentials   Signing clinician's name / credentials Benita Sierra, DPT, NCS   Functional Gait Assessment (QUITA Vargas, HUNG Winston, et al. (2004))   1. GAIT LEVEL SURFACE 2  (5.5 secs)   2. CHANGE IN GAIT SPEED 2  (more spasticity noted L LE with increased speed)   3. GAIT WITH HORIZONTAL HEAD TURNS 2  (mild path deviations)   4. GAIT WITH VERTICAL HEAD TURNS 3   5. GAIT AND PIVOT TURN 3   6. STEP OVER OBSTACLE 2  (mild assymetry with stepping over)   7. GAIT WITH NARROW BASE OF SUPPORT 2  (8 steps)   8. GAIT WITH EYES CLOSED 2  (straight but slow)   9. AMBULATING BACKWARDS 2   10. STEPS 3   Total Functional Gait Assessment Score   TOTAL SCORE: (MAXIMUM SCORE 30) 23       Functional Gait Assessment (FGA): The FGA assesses postural stability during various walking tasks.   Gait assistive device used: None    Patient Score: 23/30  Scores of <22/30 have been correlated with predicting falls in community-dwelling older adults according to Sam & Steven 2010.   Scores of <18/30 have been correlated with increased risk for falls in patients with Parkinsons Disease according to Addy Mina Ramos et al 2014.  Minimal Detectable Change for patients with acute/chronic stroke = 4.2 according to Thibarry & Ritschel 2009  Minimal Detectable Change for patients with vestibular disorder = 8 according to Sam & Steven 2010    Assessment (rationale for performing, application to patient s function & care plan): baseline balance testing, falls risk.  Minutes billed as physical performance test: 0 - day of eval

## 2020-06-15 ENCOUNTER — TELEPHONE (OUTPATIENT)
Dept: FAMILY MEDICINE | Facility: CLINIC | Age: 60
End: 2020-06-15

## 2020-06-15 NOTE — TELEPHONE ENCOUNTER
Reason for Call: Request for an order or referral:    Order or referral being requested: physical therapy    Date needed: as soon as possible    Has the patient been seen by the PCP for this problem? NO    Additional comments: pt was referred back to PCP for physical therapy referral by Dr Rivera    Phone number Patient can be reached at:  Cell number on file:    Telephone Information:   Mobile [phone]       Best Time:  any    Can we leave a detailed message on this number?  YES    Call taken on 6/15/2020 at 2:15 PM by Yuliya Cordon

## 2020-06-15 NOTE — TELEPHONE ENCOUNTER
Yuliya,    No longer patient at [facility].  PCP is Sara Lam.    Please advise patient to contact PCP.    Thank you,  Grace WILCOX RN

## 2020-06-16 ENCOUNTER — HOSPITAL ENCOUNTER (OUTPATIENT)
Dept: PHYSICAL THERAPY | Facility: CLINIC | Age: 60
Setting detail: THERAPIES SERIES
End: 2020-06-16
Attending: PSYCHIATRY & NEUROLOGY
Payer: COMMERCIAL

## 2020-06-16 PROCEDURE — 97110 THERAPEUTIC EXERCISES: CPT | Mod: GP | Performed by: PHYSICAL THERAPIST

## 2020-07-09 ENCOUNTER — MEDICAL CORRESPONDENCE (OUTPATIENT)
Dept: HEALTH INFORMATION MANAGEMENT | Facility: CLINIC | Age: 60
End: 2020-07-09

## 2020-08-24 DIAGNOSIS — G12.21 ALS (AMYOTROPHIC LATERAL SCLEROSIS) (H): Primary | ICD-10-CM

## 2020-08-26 ASSESSMENT — ENCOUNTER SYMPTOMS
TREMORS: 0
STIFFNESS: 0
HOARSE VOICE: 1
NECK MASS: 0
SMELL DISTURBANCE: 0
MUSCLE CRAMPS: 1
WEAKNESS: 1
TROUBLE SWALLOWING: 1
SEIZURES: 0
LOSS OF CONSCIOUSNESS: 0
PARALYSIS: 0
ARTHRALGIAS: 0
MUSCLE WEAKNESS: 1
DISTURBANCES IN COORDINATION: 1
DIZZINESS: 1
JOINT SWELLING: 0
SINUS PAIN: 0
SINUS CONGESTION: 1
NECK PAIN: 0
SORE THROAT: 0
TINGLING: 0
BACK PAIN: 0
MYALGIAS: 0
MEMORY LOSS: 0
TASTE DISTURBANCE: 0
NUMBNESS: 1
HEADACHES: 0
SPEECH CHANGE: 1

## 2020-08-27 ENCOUNTER — THERAPY VISIT (OUTPATIENT)
Dept: SPEECH THERAPY | Facility: CLINIC | Age: 60
End: 2020-08-27
Payer: COMMERCIAL

## 2020-08-27 ENCOUNTER — OFFICE VISIT (OUTPATIENT)
Dept: NEUROLOGY | Facility: CLINIC | Age: 60
End: 2020-08-27
Payer: COMMERCIAL

## 2020-08-27 ENCOUNTER — THERAPY VISIT (OUTPATIENT)
Dept: PHYSICAL THERAPY | Facility: CLINIC | Age: 60
End: 2020-08-27
Payer: COMMERCIAL

## 2020-08-27 VITALS
WEIGHT: 176 LBS | HEART RATE: 76 BPM | BODY MASS INDEX: 22.91 KG/M2 | DIASTOLIC BLOOD PRESSURE: 81 MMHG | OXYGEN SATURATION: 95 % | SYSTOLIC BLOOD PRESSURE: 129 MMHG

## 2020-08-27 DIAGNOSIS — G12.21 ALS (AMYOTROPHIC LATERAL SCLEROSIS) (H): ICD-10-CM

## 2020-08-27 DIAGNOSIS — G12.21 ALS (AMYOTROPHIC LATERAL SCLEROSIS) (H): Primary | ICD-10-CM

## 2020-08-27 DIAGNOSIS — R47.1 DYSARTHRIA: ICD-10-CM

## 2020-08-27 DIAGNOSIS — R25.2 LEG CRAMPS: ICD-10-CM

## 2020-08-27 DIAGNOSIS — R13.12 OROPHARYNGEAL DYSPHAGIA: Primary | ICD-10-CM

## 2020-08-27 DIAGNOSIS — R25.2 SPASTICITY: ICD-10-CM

## 2020-08-27 LAB
ALT SERPL W P-5'-P-CCNC: 60 U/L (ref 0–70)
AST SERPL W P-5'-P-CCNC: 55 U/L (ref 0–45)
EXPTIME-PRE: 4.54 SEC
FEF2575-%PRED-PRE: 106 %
FEF2575-PRE: 3.52 L/SEC
FEF2575-PRED: 3.29 L/SEC
FEFMAX-%PRED-PRE: 78 %
FEFMAX-PRE: 8.12 L/SEC
FEFMAX-PRED: 10.32 L/SEC
FEV1-%PRED-PRE: 79 %
FEV1-PRE: 3.31 L
FEV1FEV6-PRE: 84 %
FEV1FEV6-PRED: 79 %
FEV1FVC-PRE: 84 %
FEV1FVC-PRED: 76 %
FIFMAX-PRE: 5.31 L/SEC
FVC-%PRED-PRE: 72 %
FVC-PRE: 3.96 L
FVC-PRED: 5.44 L
MEP-PRE: 72 CMH2O
MIP-PRE: -69 CMH2O

## 2020-08-27 RX ORDER — IBUPROFEN 600 MG/1
1 TABLET, FILM COATED ORAL EVERY 6 HOURS PRN
COMMUNITY
Start: 2020-07-31

## 2020-08-27 RX ORDER — HYDROCODONE BITARTRATE AND ACETAMINOPHEN 5; 325 MG/1; MG/1
1 TABLET ORAL DAILY
COMMUNITY
Start: 2020-07-31

## 2020-08-27 RX ORDER — BACLOFEN 10 MG/1
TABLET ORAL
Qty: 60 TABLET | Refills: 1 | Status: SHIPPED | OUTPATIENT
Start: 2020-08-27 | End: 2020-10-17

## 2020-08-27 RX ORDER — TRAMADOL HYDROCHLORIDE 50 MG/1
50 TABLET ORAL DAILY PRN
Qty: 15 TABLET | Refills: 1 | Status: SHIPPED | OUTPATIENT
Start: 2020-08-27

## 2020-08-27 ASSESSMENT — REVISED AMYOTROPHIC LATERAL SCLEROSIS FUNCTIONAL RATING SCALE (ALSFRS-R)
HANDWRITING: NORMAL
SWALLOWING: 3
RESPIRATORY_SUBTOTAL_SCORE: 12
SALIVATION: 3
SALIVATION: SLIGHT BUT DEFINITE EXCESS OF SALIVA IN MOUTH; MAY HAVE NIGHTTIME DROOLING
WALKING: EARLY AMBULATION DIFFICULTIES
SPEECH: 3
FINE_MOTOR_SUBTOTAL_SCORE: 12
RESPIRATORY_INSUFFICIENCY: 4
SIX_ITEM_SUBTOTAL: 21
ORTHOPENA: 4
CLIMBING_STAIRS: NEEDS ASSISTANCE
TURNING_IN_BED_AND_ADJUSTING_BED_CLOTHES: SOMEWHAT SLOW AND CLUMSY, BUT NO HELP NEEDED
DRESSING_AND_HYGEINE: NORMAL FUNCTION
WALKING: 3
HANDWRITING: 4
CUTTING_FOOD_AND_HANDLING_UTENSILES: 4
SPEECH: DETECTABLE SPEECH DISTURBANCES
TURNING_IN_BED_AND_ADJUSTING_BED_CLOTHES: 3
DRESSING_AND_HYGEINE: 4
DYSPNEA: 4
CLIMBING_STAIRS: 1
SWALLOWING: EARLY EATING PROBLEMS - OCCASIONAL CHOKING
GROSS_MOTOR_SUBTOTAL_SCORE: 7
BULBAR_SUBTOTAL: 9
ALSFRS_TOTAL_SCORE: 40

## 2020-08-27 ASSESSMENT — PAIN SCALES - GENERAL: PAINLEVEL: NO PAIN (0)
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
## 2020-08-27 NOTE — PROGRESS NOTES
Outpatient Speech Language Pathology Neurology Clinic Evaluation  Arjun Muro YOB: 1960 [id number]    Visit Date: 8/27/2020    Age: 59 year old    Medical Diagnosis: Amyotrophic lateral sclerosis (ALS)    Date of Diagnosis: 5/14/20    Referring MD: Dr Rivera     present: No    PMH: Refer to Medical Chart    Fall Risk:Refer to physician report.    Others at Clinic Visit:Spouse    Living Situation: With others    Patient Concerns/Goals: Increased swallowing difficulty, Increased speech deficits    Observations: Patient and wife pleasant and cooperative, wife becomes tearful throughout visit but they are eager to learn as much as possible.    Current Mode of Nutrition: Oral diet of regular solids and thin liquids    Weight: 176lbs    Cardio-Respiratory Status: Forced vital capacity: 72%    Functional Rating Scale (ALS-FRS): 40/48    Eating Assessment Tool (EAT10): 2/40   0  My swallowing problem has caused me to lose weight   0  My swallowing problem interferes with my ability to go out for meals   0  Swallowing liquids takes extra effort   0  Swallowing solids takes extra effort   1  Swallowing pills takes extra effort   0  Swallowing is painful   0  The pleasure of eating is affected by my swallowing   0  When I swallow food sticks in my throat   1  I cough when I eat   0  Swallowing is stressful      Oral Motor/Swallowing  Oral Motor Function:  Generally intact although slight lingual weakness suggested    Volitional Abilities:  Cough- Functional  Throat Clear- Functional  Swallow- Present    Feeding Assistance: No assistance needed    Swallow Function:   Thin Liquid-  Oral phase appears intact with adequate bolus control and propulsion. No overt delay in swallow intiation. Laryngeal elevation palpated to be adequate. No overt s/s aspiration/penetration but persistent throat clear following PO trials which pt strongly feels are related to phlegm and allergies, not related to  any dysphagia.  Regular Solid-  Adequate mastication as well as bolus prep, control, and propulsion. No overt delay in swallow intiation. Laryngeal elevation palpated to be adequate. No overt s/s aspiration/penetration.    Dysphagia Diagnosis: Swallow within functional limits noted today but he reports some difficulty with fatigue and when neck extended while drinking.      Speech Intelligibility/Functional Communication   Methods of communication: Verbal    Dysarthria: Minimal    Speech:   Deficits in phonation- Hoarse quality and MD reports strained-strangled quality although not appreciated by this SLP  Deficits in articulation- No overt slurring noted today although pt/wife report decreased precision of articlation when fatigued. They both feel speaking rate is slower than baseline although [place] reading suggests speaking rate of 143 words per minute (less than 125wpm suggests impairment)  Intelligibility- 100%    Speech Intelligibility/Communication:  Communicates functionally    Augmentative and Alternative Communication (AAC):   Educated in AAC and voice banking as below.      Clinical Impression/Plan of Care  Treatment Diagnosis: Oropharyngeal Dysphagia and Dysarthia     Recommendations:  Oral diet of regular solids and thin liquids.  Continue use of compensatory strategies.  Continue compensatory speech strategies.  Initiate Voice Banking.    Plan of Care:  1 session evaluation and treatment.    Goals: Based on today's evaluation session patient and/or caregiver will have understanding of current communication and/or swallowing status and recommendations for management.    Educational Assessment:  Learners- Patient and Significant other  Barriers to Learning- No barriers.    Education provided/response:   Speech  Swallowing  AAC  Diet  Verbalized understanding    Treatment provided this date:   Swallow intervention, 19 minutes  SLP educated in detail regarding the anatomy and physiology of  swallowing. Trained safe swallow strategies to reduce aspiration risks (including slow rate of intake, small bites/sips, chin tuck/neutral head position, pills in purees, mindful swallowing, use caution with straws, etc.) Educated in role of oral cares in respiratory health with increased aspiration risks. Also trained diet modifications to reduce risk of aspiration and ease intake including avoid hard dry foods with particles and choosing soft moist solids. SLP educated in concept of thickened liquids including rationale and possible benefit in reducing aspiration.  Communication intervention, 16 minutes  SLP provided education in lingual and labial impact on speech and articulation as well as respiratory status on breath support for speech and voice projection. SLP trained use of speech strategies to improve intelligibility and reduce fatigue with speaking (including reducing background noise, facing the conversation partner, speaking slowly, exaggerating each sound, repeating words or rewording message, using as few words as possible, planning ahead to reduce impact of fatigue, etc.) SLP also trained conversational partner/wife regarding listener strategies (including giving speaker full attention, letting the speaker know if message not understood, repeating the part of the message that was understood so speaker only needs to repeat the part that was missed, asking yes/no questions when able.) SLP introduced concept of Augmentative-Alternative Communication and educated in detail re Voice Banking, including reasoning, benefit, and process. SLP asked [facility] to supply equipment.    Response to recommendations/treatment: verbalized understanding, asked appropriate questions, agreed to recommendations    Goal attainment: All goals met    Risks and benefits of evaluation/treatment have been explained.  Patient, family and/or caregiver are in agreement with Plan of Care.    Timed Code Treatment Minutes: 0 minutes of  timed codes    Total Treatment Time (sum of timed and untimed services): 40 minutes

## 2020-08-27 NOTE — NURSING NOTE
Chief Complaint   Patient presents with     RECHECK     [facility] RETURN - ALS/MOTOR NEURON      Junie Zhang, EMT

## 2020-08-27 NOTE — PATIENT INSTRUCTIONS
Edaravone prescription- will work on this  Blood tests today for riluzole (liver function)  Physical therapy and speech therapy referrals  Tramadol prescription for cramps  Follow up in 6 months (once back from [place])  I will contact Shelia Dutton in [facility] to check that she will see you in 11/2020.       Please call [facility] @ [phone] for questions or concerns during regular business hours. For a more efficient way to communicate, use "[employer]" and address the message to your physician. Remember, [employer] is only read during business hours. Do not leave urgent messages on [employer] or "[employer] If situation is urgent, contact the Neurology Clinic @ [phone] and ask to speak to a Triage Nurse or Call 911 or visit an Emergency Department.    Please call your pharmacy if you need a medication refill. They will send us an electronic message.

## 2020-08-31 NOTE — PROGRESS NOTES
"    OUTPATIENT PHYSICAL THERAPY CLINIC NOTE  Arjun Muro  YOB: 1960  [id number]    Type of visit:         Evaluation     Date of service: 8/27/2020    Referring provider: Donovan Hopkins MD     present: No    Others present at visit:  Spouse / significant other    Medical diagnosis:   Amyotrophic lateral sclerosis (ALS)    Date of diagnosis: 5/14/2020    Pertinent history of current problem (include personal factors and/or comorbidities that impact the plan of care): Patient reporting initial symptoms beginning January 2020- began as increased L foot drop when running. Patient also noted cramping in LLE and muscle twitches in back, thighs and shoulders. Patient with increasing spasticity in LEs.    Cardio-respiratory status:  Forced vital capacity: 79 % of predicted     Height/Weight: 6'2\" / 176    Living environment:  House    Living environment barriers:  4 stairs to enter (1 railing present)  Bed and bath on main level  Laundry in basement (unfinished)     Current assistance/living environment:  Lives with wife      Current mobility equipment:  Walking stick     Current ADL equipment:  None    Technology used: computer, smartphone    Patient concerns/goals: Increased difficulty ambulating due to stiffness in legs    Evaluation   Interview completed.   Pain assessment:  Pain present: reported stiffness in LEs/back   Range of motion: B ankle DF neutral      Manual muscle testing:  B hip flexion 5/5, B knee flexion/extension: 5/5, B ankle DF 4-/5   Gait:  Patient ambulates with a significant spastic gait pattern (much more than upon seated spasticity testing)- notable in B adductors and hamstrings leading to a NBOS and rigid gait pattern. Foot contact with turns, limited to no B knee flexion for swing phase. Decreased heel strike/push off   Cognition:  Intact   Spasticity: B hamstrings, hip extensors and ankle DF 2. Much more notable once in standing- stronger extensor spasticity    Fall " Risk Screen:   Has the patient fallen 2 or more times in the last year? No      Has the patient fallen and had an injury in the past year? No       Timed Up and Go Score: N/A    Is the patient a fall risk? No     Impairments:  Fatigue  Muscle atrophy  Balance  Range of motion  Spasticity     Treatment diagnosis:  Impaired mobility  Impaired activities of daily living    Clinical Presentation: Evolving/Changing  Clinical Presentation Rationale: personal factors, body systems involved, progressive nature of disease  Clinical Decision Making (Complexity): Moderate complexity     Recommendations/Plan of care:  Patient would benefit from interventions to enhance safety and independence.  Physical therapy intervention for  gait training, balance, ROM .     Goals:   Target date: 8/28/2020  Patient, family and/or caregiver will verbalize understanding of evaluation results and implications for functional performance.  Patient, family and/or caregiver will verbalize/demonstrate understanding of home program.  Patient, family and/or caregiver will verbalize/demonstrate understanding of positioning techniques/equipment.  Patient, family and/or caregiver will verbalize energy management techniques appropriate for status and setting.    Educational assessment/barriers to learning:   No barriers noted     Treatment provided this date:   Therapeutic exercise- 15 minutes  - PT performed passive stretch to B gastrocs for improved tissue mobility and standing alignment. Patient return demonstrated home stretching including hamstrings and gastrocs x 30 seconds B. Encouraged patient to stretch 3x/day due to reported stiffness and noted spasticity when standing/ambulating.  - Patient ambulated with walking pole; improved stability noted compared to no device due to significant spasticity when ambulating.   - Collaborated with patient on other spasticity management including possible medication. Collaborated with MD; reported will begin  Baclofen to assist with functional stability.   - Encouraged patient to focus on cardiovascular conditioning and stretching program versus strengthening due to disease process. Collaborated with patient on use of bike stand to make road bike stationary in home for safety    Response to treatment/recommendations: Patient verbalizes agreement and return demonstrates    Goal attainment:  All goals met     Risks and benefits of evaluation/treatment have been explained.  Patient, family and/or caregiver are in agreement with Plan of Care.     Timed Code Treatment Minutes: 15  Total Treatment Time (sum of timed and untimed services): 25    Signature: Hannah Canales, PT   Date: 8/31/2020

## 2020-09-02 ENCOUNTER — TELEPHONE (OUTPATIENT)
Dept: NEUROLOGY | Facility: CLINIC | Age: 60
End: 2020-09-02

## 2020-09-02 NOTE — TELEPHONE ENCOUNTER
[facility] Call Center    Phone Message    May a detailed message be left on voicemail: yes     Reason for Call: Other: Marcela calling in regards to application that was dropped off for patient yesterday 09/01/20 to enroll in new medication of Radicava (edaravone). She is checking to make sure the clinic received this application. Marcela also stated that patients insurance will be changing next week and wants to know if that needs to be updated in order for medication to be approved.     Please advise     Action Taken: Other:  NEUROLOGY     Travel Screening: Not Applicable

## 2020-09-02 NOTE — TELEPHONE ENCOUNTER
I returned a call to Marcela and left a vm explaining that I did not find any application that was dropped off. I asked for a call back.

## 2020-09-04 ENCOUNTER — CARE COORDINATION (OUTPATIENT)
Dept: NEUROLOGY | Facility: CLINIC | Age: 60
End: 2020-09-04

## 2020-09-04 NOTE — PROGRESS NOTES
Dr. Rivera wrote letter for pt today requesting it be mailed to pt home. I placed letter in mail today.     Kait LAI

## 2020-09-15 ENCOUNTER — HOSPITAL ENCOUNTER (OUTPATIENT)
Dept: PHYSICAL THERAPY | Facility: CLINIC | Age: 60
Setting detail: THERAPIES SERIES
End: 2020-09-15
Attending: PSYCHIATRY & NEUROLOGY
Payer: COMMERCIAL

## 2020-09-15 PROCEDURE — 97110 THERAPEUTIC EXERCISES: CPT | Mod: GP | Performed by: PHYSICAL THERAPIST

## 2020-09-15 PROCEDURE — 97535 SELF CARE MNGMENT TRAINING: CPT | Mod: GP | Performed by: PHYSICAL THERAPIST

## 2020-09-15 NOTE — IP AVS SNAPSHOT
MRN:[id number]                      After Visit Summary   9/15/2020    Arjun Muro    MRN: [id number]           Visit Information        Provider Department      9/15/2020  2:15 PM Alessia Sierra, PT [facility], [place], Physical Therapy - Outpatient        Your next 10 appointments already scheduled    Oct 01, 2020 11:15 AM CDT  Neuro Treatment with Alessia Sierra, PT  [facility], [place], Physical Therapy - Outpatient ([facility], [facility]) [address], [address]  [place] [address]  [phone]      Apr 08, 2021 10:30 AM CDT  PFT VISIT with  PFL B  [facility] Pulmonary Function Testing ([facility]) [address] [place] [address] [id number] [phone]      Apr 08, 2021 11:00 AM CDT  (Arrive by 10:45 AM)  Return ALS/Motor [place] with Abdulkadir Rivera MD  [facility] Neurology ([facility]) [address] [place] [id number] [phone]           Further instructions from your care team       Swimming  - Consider use of flotation - belt or kick board  - Laps along the outside wall  - no swimming alone      Endurance  - Would recommend transition to something more stable - arm bike and/or stationary bike. Can split your time between arms and legs.      Stretching          HEROZhart Information    [employer] gives you secure access to your electronic health record. If you see a primary care provider, you can also send messages to your care team and make appointments. If you have questions, please call your primary care clinic.  If you do not have a primary care provider, please call [phone] and they will assist you.       Care EveryWhere ID    This is your Care EveryWhere ID. This could be used by other organizations to access your [place] medical records  [id number]       Equal Access to Services    AJ SILVESTRE: Jose Ramos,  laura martin, fior zazueta, aung darby. So [facility] [phone].    ATENCIÓN: Si habla español, tiene a castro disposición servicios gratuitos de asistencia lingüística. Llame al [phone].    We comply with applicable federal and state civil rights laws, including the [place] Human Rights Act. We do not discriminate on the basis of race, color, creed, Congregational, national origin, marital status, age, disability, sex, sexual orientation, or gender identity.

## 2020-09-15 NOTE — DISCHARGE INSTRUCTIONS
Swimming  - Consider use of flotation - belt or kick board  - Laps along the outside wall  - no swimming alone      Endurance  - Would recommend transition to something more stable - arm bike and/or stationary bike. Can split your time between arms and legs.      Stretching

## 2020-09-16 DIAGNOSIS — G12.21 ALS (AMYOTROPHIC LATERAL SCLEROSIS) (H): ICD-10-CM

## 2020-09-16 RX ORDER — RILUZOLE 50 MG/1
TABLET, FILM COATED ORAL
Qty: 60 TABLET | Refills: 2 | Status: SHIPPED | OUTPATIENT
Start: 2020-09-16 | End: 2020-12-30

## 2020-09-22 PROBLEM — G12.21 ALS (AMYOTROPHIC LATERAL SCLEROSIS) (H): Status: ACTIVE | Noted: 2020-09-22

## 2020-10-02 NOTE — PROGRESS NOTES
Outpatient Physical Therapy Discharge Note     Patient: Arjun Muro  : 1960    Beginning/End Dates of Reporting Period:  2020 to 9/15/2020 (3 visits)    Referring Provider: Dr Williams Rivera    Therapy Diagnosis: impaired gait and balance     Client Self Report: New housing with 4-5 stairs with railing. Currently selling their other house that had a lot of stairs. Also purchased a one level home in [place]. Had a fall 1.5 weeks ago backwards on the stairs at the old house - no railings, went backwards and hit head. Did have some concussion symptoms from that, but better now. Planning to move down to [facility] in early October, will stay down there until spring. I need some input on stretching.    Goals:  Goal Identifier HEP   Goal Description Pt demo understanding of safe exercise in context of ALS.   Target Date 20   Date Met   9/15/2020   Progress: Pt indep with stretching safely without getting on the floor, safe use of gym for ongoing wellness.     Goal Identifier Compensation strategies   Goal Description Pt report understanding of at least 3 compensation strategies for management of fatigue to promote safety/reduce falls risk, and improve QOL.   Target Date 20   Date Met   9/15/2020   Progress: Identified needs for railings, use of walking pole for balance, safe exercise plans. Pt demo understanding.       Progress Toward Goals:   Progress limited due to lack of attendance. Pt transitioning to [place] - will seek out PT there.    Plan:  Discharge from therapy.    Discharge:    Reason for Discharge: Patient chooses to discontinue therapy.    Discharge Plan: Patient to continue home program. Seek out connection with ALS clinic in [place].

## 2020-10-17 DIAGNOSIS — R25.2 SPASTICITY: ICD-10-CM

## 2020-10-17 RX ORDER — BACLOFEN 10 MG/1
TABLET ORAL
Qty: 90 TABLET | Refills: 2 | Status: SHIPPED | OUTPATIENT
Start: 2020-10-17

## 2020-10-26 ENCOUNTER — TRANSFERRED RECORDS (OUTPATIENT)
Dept: HEALTH INFORMATION MANAGEMENT | Facility: CLINIC | Age: 60
End: 2020-10-26

## 2020-12-30 DIAGNOSIS — G12.21 ALS (AMYOTROPHIC LATERAL SCLEROSIS) (H): ICD-10-CM

## 2020-12-30 RX ORDER — RILUZOLE 50 MG/1
TABLET, FILM COATED ORAL
Qty: 60 TABLET | Refills: 2 | Status: SHIPPED | OUTPATIENT
Start: 2020-12-30

## 2020-12-30 NOTE — TELEPHONE ENCOUNTER
[facility] Call Center    Phone Message    May a detailed message be left on voicemail: yes     Reason for Call: Medication Refill Request    Has the patient contacted the pharmacy for the refill? Yes   Name of medication being requested: riluzole (RILUTEK) 50 MG tablet  Provider who prescribed the medication: Dr. Rivera  Pharmacy: [facility] in [facility] on [address]  Date medication is needed: soon, 1/21/21         Action Taken: Message routed to:  [facility]    Travel Screening: Not Applicable

## 2021-01-03 ENCOUNTER — HEALTH MAINTENANCE LETTER (OUTPATIENT)
Age: 61
End: 2021-01-03

## 2021-04-05 DIAGNOSIS — G12.21 ALS (AMYOTROPHIC LATERAL SCLEROSIS) (H): Primary | ICD-10-CM

## 2021-10-10 ENCOUNTER — HEALTH MAINTENANCE LETTER (OUTPATIENT)
Age: 61
End: 2021-10-10

## 2022-01-29 ENCOUNTER — HEALTH MAINTENANCE LETTER (OUTPATIENT)
Age: 62
End: 2022-01-29

## 2022-09-18 ENCOUNTER — HEALTH MAINTENANCE LETTER (OUTPATIENT)
Age: 62
End: 2022-09-18

## 2023-05-06 ENCOUNTER — HEALTH MAINTENANCE LETTER (OUTPATIENT)
Age: 63
End: 2023-05-06

## 2023-05-20 NOTE — MR AVS SNAPSHOT
"              After Visit Summary   6/12/2018    Arjun Muro    MRN: [id number]           Patient Information     Date Of Birth          1960        Visit Information        Provider Department      6/12/2018 12:45 PM Sivan Mullins MD [facility]        Today's Diagnoses     Severe episode of recurrent major depressive disorder, without psychotic features (H)    -  1    Anxiety        Insomnia, unspecified type        High risk medication use        Encounter for routine screening for malformation using ultrasonics        Encounter for vitamin deficiency screening           Follow-ups after your visit        Who to contact     If you have questions or need follow up information about today's clinic visit or your schedule please contact [facility] directly at [phone].  Normal or non-critical lab and imaging results will be communicated to you by AptDecohart, letter or phone within 4 business days after the clinic has received the results. If you do not hear from us within 7 days, please contact the clinic through AptDecohart or phone. If you have a critical or abnormal lab result, we will notify you by phone as soon as possible.  Submit refill requests through [email or web address] or call your pharmacy and they will forward the refill request to us. Please allow 3 business days for your refill to be completed.          Additional Information About Your Visit        AptDecohart Information     [email or web address] lets you send messages to your doctor, view your test results, renew your prescriptions, schedule appointments and more. To sign up, go to [email or web address] . Click on \"Log in\" on the left side of the screen, which will take you to the Welcome page. Then click on \"Sign up Now\" on the right side of the page.     You will be asked to enter the access code listed below, as well as some personal information. Please follow the directions to create your username and password.     Your access code " "is: [email or web address]  Expires: 2018 12:37 PM     Your access code will  in 90 days. If you need help or a new code, please call your [place] clinic or [phone].        Care EveryWhere ID     This is your Care EveryWhere ID. This could be used by other organizations to access your [place] medical records  [id number]        Your Vitals Were     Pulse Temperature Height Pulse Oximetry BMI (Body Mass Index)       60 98.4  F (36.9  C) (Oral) 6' 1.5\" (1.867 m) 99% 21.97 kg/m2        Blood Pressure from Last 3 Encounters:   18 122/66   18 102/64    Weight from Last 3 Encounters:   18 168 lb 12.8 oz (76.6 kg)   18 167 lb 11.2 oz (76.1 kg)              We Performed the Following     **TSH with free T4 reflex FUTURE anytime     CBC with platelets differential     Comprehensive metabolic panel (BMP + Alb, Alk Phos, ALT, AST, Total. Bili, TP)     T4 free     Testosterone, total     Vitamin D Deficiency        Primary Care Provider Office Phone # Fax #    Sivan Mullins [phone]       [facility] [place] [address]        Equal Access to Services     AJ RODRIGUEZ AH: Hadii aad ku hadasho Soomaali, waaxda luqadaha, qaybta kaalmada adeegyada, waxay idiin hayaan andie coughlin . So [facility] [phone].    ATENCIÓN: Si habla español, tiene a castro disposición servicios gratuitos de asistencia lingüística. Llame al [phone].    We comply with applicable federal civil rights laws and [place] laws. We do not discriminate on the basis of race, color, national origin, age, disability, sex, sexual orientation, or gender identity.            Thank you!     Thank you for choosing [facility]  for your care. Our goal is always to provide you with excellent care. Hearing back from our patients is one way we can continue to improve our services. Please take a few minutes to complete the written survey that you may receive in the mail after your " visit with us. Thank you!             Your Updated Medication List - Protect others around you: Learn how to safely use, store and throw away your medicines at www.disposemymeds.org.          This list is accurate as of 6/12/18 11:59 PM.  Always use your most recent med list.                   Brand Name Dispense Instructions for use Diagnosis    ALLERGY INJECTIONS      every 28 days        amitriptyline 10 MG tablet    ELAVIL     Take 20 mg by mouth At Bedtime        citalopram 20 MG tablet    celeXA     Take 20 mg by mouth daily        * diazepam 10 MG tablet    VALIUM     Take 10 mg by mouth every 6 hours as needed for anxiety or sleep        * diazepam 5 MG tablet    VALIUM    10 tablet    Take 1 tablet (5 mg) by mouth every 12 hours as needed for anxiety or sleep    Insomnia, unspecified type, Anxiety, Severe episode of recurrent major depressive disorder, without psychotic features (H)       FISH OIL TRIPLE STRENGTH 1400 MG Caps      Take 1 capsule by mouth daily        ibuprofen 200 MG tablet    ADVIL/MOTRIN     Take 200 mg by mouth every 4 hours as needed for mild pain (take 2 tabs as needed)        mirtazapine 15 MG tablet    REMERON     Take 15 mg by mouth At Bedtime    Severe episode of recurrent major depressive disorder, without psychotic features (H), Anxiety, Insomnia, unspecified type, High risk medication use, Encounter for routine screening for malformation using ultrasonics, Encounter for vitamin deficiency screening       MULTI vitamin  MENS Tabs      Take 1 tablet by mouth daily        traZODone 50 MG tablet    DESYREL     Take 50 mg by mouth At Bedtime        Turmeric 500 MG Caps      Take 1 capsule by mouth daily        WELLBUTRIN  MG 24 hr tablet   Generic drug:  buPROPion      Take 300 mg by mouth every morning        * Notice:  This list has 2 medication(s) that are the same as other medications prescribed for you. Read the directions carefully, and ask your doctor or other care  provider to review them with you.       Opt out

## 2024-02-25 ENCOUNTER — HEALTH MAINTENANCE LETTER (OUTPATIENT)
Age: 64
End: 2024-02-25